# Patient Record
Sex: MALE | Race: WHITE | Employment: FULL TIME | URBAN - METROPOLITAN AREA
[De-identification: names, ages, dates, MRNs, and addresses within clinical notes are randomized per-mention and may not be internally consistent; named-entity substitution may affect disease eponyms.]

---

## 2017-05-15 ENCOUNTER — ALLSCRIPTS OFFICE VISIT (OUTPATIENT)
Dept: OTHER | Facility: OTHER | Age: 42
End: 2017-05-15

## 2018-01-12 NOTE — PROGRESS NOTES
Assessment    1  Screening for depression (V79 0) (Z13 89)   2  Encounter for preventive health examination (V70 0) (Z00 00)   3  Convulsions (780 39) (R56 9)    Plan  Screening for depression    · *VB-Depression Screening; Status:Complete - Retrospective Authorization;   Done:  [id number] 08:24AM    Discussion/Summary    Physical: pt in good health  Declines BW or vaccines  convulsions: requested records from [facility] neuro  Pt will c/w Torpolol  f/u 6 months  Possible side effects of new medications were reviewed with the patient/guardian today  The treatment plan was reviewed with the patient/guardian  The patient/guardian understands and agrees with the treatment plan     Self Referrals: No      Chief Complaint  CPE AND MEDICATION REFILL      History of Present Illness  HPI: 42 yo male here for physical  Needs refill on his Toprolol XL which he takes for "convulsions" - pt does not have seizure d/o  Pt tolerating med well  Requested records from [facility] last year but they were not sent  Review of Systems    Constitutional: No fever or chills, feels well, no tiredness, no recent weight gain or weight loss  Eyes: No complaints of eye pain, no red eyes, no discharge from eyes, no itchy eyes  ENT: no complaints of earache, no hearing loss, no nosebleeds, no nasal discharge, no sore throat, no hoarseness  Cardiovascular: No complaints of slow heart rate, no fast heart rate, no chest pain, no palpitations, no leg claudication, no lower extremity  Respiratory: No complaints of shortness of breath, no wheezing, no cough, no SOB on exertion, no orthopnea or PND  Gastrointestinal: No complaints of abdominal pain, no constipation, no nausea or vomiting, no diarrhea or bloody stools  Genitourinary: No complaints of dysuria, no incontinence, no hesitancy, no nocturia, no genital lesion, no testicular pain     Musculoskeletal: No complaints of arthralgia, no myalgias, no joint swelling or stiffness, no limb pain or swelling  Integumentary: No complaints of skin rash or skin lesions, no itching, no skin wound, no dry skin  Neurological: No compliants of headache, no confusion, no convulsions, no numbness or tingling, no dizziness or fainting, no limb weakness, no difficulty walking  Psychiatric: Is not suicidal, no sleep disturbances, no anxiety or depression, no change in personality, no emotional problems  Endocrine: No complaints of proptosis, no hot flashes, no muscle weakness, no erectile dysfunction, no deepening of the voice, no feelings of weakness  Hematologic/Lymphatic: No complaints of swollen glands, no swollen glands in the neck, does not bleed easily, no easy bruising  Active Problems    1  Convulsions (780 39) (R56 9)   2  Screening for depression (V79 0) (Z13 89)    Surgical History    · History of Surgery Vas Deferens Vasectomy   · History of Tonsillectomy    Family History  Mother    · Family history of malignant neoplasm (V16 9) (Z80 9)  Father    · Family history of malignant neoplasm (V16 9) (Z80 9)  Grandparent    · Family history of malignant neoplasm of prostate (V16 42) (Z80 45)  Grandmother    · Family history of myocardial infarction (V17 3) (Z82 49)    Social History    · Former consumption of alcohol (V11 3) (Z87 898)   · Former smoker (V15 82) (Q26 270)    Current Meds   1  Metoprolol Succinate ER 25 MG Oral Tablet Extended Release 24 Hour; take 1 tablet by   mouth once daily; Therapy: [id number] to (Evaluate:13Doi0419)  Requested for: 22NHT7372; Last   DH:[id number] Ordered    Allergies    1  No Known Drug Allergies    Vitals   Recorded: 74LDW8902 08:22AM   Temperature 96 9 F   Heart Rate 86   Respiration 18   Systolic 920   Diastolic 80   Height 5 ft 8 5 in   Weight 140 lb 5 oz   BMI Calculated 21 02   BSA Calculated 1 77   O2 Saturation 98     Physical Exam    Constitutional   General appearance: No acute distress, well appearing and well nourished      Head and Face Head and face: Normal     Eyes   Conjunctiva and lids: No erythema, swelling or discharge  Ears, Nose, Mouth, and Throat   External inspection of ears and nose: Normal     Nasal mucosa, septum, and turbinates: Normal without edema or erythema  Lips, teeth, and gums: Normal, good dentition  Oropharynx: Normal with no erythema, edema, exudate or lesions  Neck   Neck: Supple, symmetric, trachea midline, no masses  Pulmonary   Respiratory effort: No increased work of breathing or signs of respiratory distress  Auscultation of lungs: Clear to auscultation  Cardiovascular   Auscultation of heart: Normal rate and rhythm, normal S1 and S2, no murmurs  Examination of extremities for edema and/or varicosities: Normal     Abdomen   Abdomen: Non-tender, no masses  Liver and spleen: No hepatomegaly or splenomegaly  Lymphatic   Palpation of lymph nodes in neck: No lymphadenopathy  Musculoskeletal   Gait and station: Normal     Muscle strength/tone: Normal     Skin   Skin and subcutaneous tissue: Normal without rashes or lesions  Neurologic   Cranial nerves: Cranial nerves 2-12 intact  Psychiatric   Orientation to person, place and time: Normal        Results/Data  *VB-Depression Screening [id number] 08:24AM Yolis Hutchinson     Test Name Result Flag Reference   Depression Scale Result      Depression Screen - Negative For Symptoms       Attending Note  Attending Note: Attending Note: I agree with the Resident management plan as it was presented to me  I agree with the Resident's note        Signatures   Electronically signed by : Brittany Anderson DO; May 15 2017  5:03PM EST                       (Author)    Electronically signed by : FRANCISCO Abbasi ; May 17 2017 10:15AM EST                       (Author)

## 2018-01-14 VITALS
BODY MASS INDEX: 20.78 KG/M2 | WEIGHT: 140.31 LBS | TEMPERATURE: 96.9 F | RESPIRATION RATE: 18 BRPM | SYSTOLIC BLOOD PRESSURE: 108 MMHG | OXYGEN SATURATION: 98 % | DIASTOLIC BLOOD PRESSURE: 80 MMHG | HEART RATE: 86 BPM | HEIGHT: 69 IN

## 2018-01-15 NOTE — PROGRESS NOTES
Assessment   1  Convulsions (780 39) (R56 9)  2  Family history of malignant neoplasm of prostate (V16 42) (Z80 42) : Grandparent  3  Former consumption of alcohol (V11 3) (Z19 064)    Plan  Convulsions    · Renew: Metoprolol Succinate ER 25 MG Oral Tablet Extended Release 24 Hour; TAKE 1  TABLET ONCE DAILY    Discussion/Summary    Hx of convulsions - pt stable on Toprolol  Refilled medication  Will try to obtain records from [facility]  Health maintenance - pt did not want blood work today  HE also did not want TdaP  Follow up in 1 year  Chief Complaint  yearly CPE NEEDS BETA BLOCKER MEDICATION REFILLED FOR SEIZURE DISORDER      History of Present Illness  HM, Adult Male: The patient is being seen for a health maintenance evaluation  General Health:   Screening:   HPI: 35 yo male comes in for annual physical  Has hx of seizure like episode 3-4 years ago, for which he was evaluated by a neurologist and cleared of epilepsy  Started pt on low dose BB " to calm him" and since then has had no seizure like activity  No acute complaints today  Review of Systems    Constitutional: No fever or chills, feels well, no tiredness, no recent weight gain or weight loss  Eyes: No complaints of eye pain, no red eyes, no discharge from eyes, no itchy eyes  ENT: no complaints of earache, no hearing loss, no nosebleeds, no nasal discharge, no sore throat, no hoarseness  Cardiovascular: No complaints of slow heart rate, no fast heart rate, no chest pain, no palpitations, no leg claudication, no lower extremity  Respiratory: No complaints of shortness of breath, no wheezing, no cough, no SOB on exertion, no orthopnea or PND  Gastrointestinal: No complaints of abdominal pain, no constipation, no nausea or vomiting, no diarrhea or bloody stools  Genitourinary: No complaints of dysuria, no incontinence, no hesitancy, no nocturia, no genital lesion, no testicular pain     Musculoskeletal: No complaints of arthralgia, no myalgias, no joint swelling or stiffness, no limb pain or swelling  Integumentary: No complaints of skin rash or skin lesions, no itching, no skin wound, no dry skin  Neurological: No compliants of headache, no confusion, no convulsions, no numbness or tingling, no dizziness or fainting, no limb weakness, no difficulty walking  Psychiatric: Is not suicidal, no sleep disturbances, no anxiety or depression, no change in personality, no emotional problems  Endocrine: No complaints of proptosis, no hot flashes, no muscle weakness, no erectile dysfunction, no deepening of the voice, no feelings of weakness  Hematologic/Lymphatic: No complaints of swollen glands, no swollen glands in the neck, does not bleed easily, no easy bruising  ROS reviewed  Active Problems   1  Convulsions (780 39) (R56 9)    Surgical History    · History of Surgery Vas Deferens Vasectomy   · History of Tonsillectomy    Family History    · Family history of malignant neoplasm (V16 9) (Z80 9)    · Family history of malignant neoplasm (V16 9) (Z80 9)    · Family history of malignant neoplasm of prostate (V16 42) (Z80 42)    · Family history of myocardial infarction (V17 3) (Z82 49)    Social History    · Former consumption of alcohol (V11 3) (Z87 898)   · Former smoker (V15 82) (I22 998)    Current Meds  1  Metoprolol Succinate ER 25 MG Oral Tablet Extended Release 24 Hour; TAKE 1 TABLET   ONCE DAILY; Therapy: 01OPX6355 to (Evaluate:30Jun2015)  Requested for: 02VUH6642; Last   Rx:02Mar2015 Ordered    Allergies   1  No Known Drug Allergies    Vitals   Recorded: 01Apr2016 08:35AM   Temperature 98 F   Heart Rate 81   Respiration 18   Systolic 673   Diastolic 80   Height 5 ft 8 5 in   Weight 145 lb    BMI Calculated 21 73   BSA Calculated 1 79   O2 Saturation 99     Physical Exam    Constitutional   General appearance: No acute distress, well appearing and well nourished      Head and Face   Head and face: Normal  Eyes   Conjunctiva and lids: No erythema, swelling or discharge  Ears, Nose, Mouth, and Throat   External inspection of ears and nose: Normal     Otoscopic examination: Tympanic membranes translucent with normal light reflex  Canals patent without erythema  Nasal mucosa, septum, and turbinates: Normal without edema or erythema  Lips, teeth, and gums: Normal, good dentition  Oropharynx: Normal with no erythema, edema, exudate or lesions  Neck   Neck: Supple, symmetric, trachea midline, no masses  Pulmonary   Respiratory effort: No increased work of breathing or signs of respiratory distress  Auscultation of lungs: Clear to auscultation  Cardiovascular   Auscultation of heart: Normal rate and rhythm, normal S1 and S2, no murmurs  Pedal pulses: 2+ bilaterally  Peripheral vascular exam: Normal     Examination of extremities for edema and/or varicosities: Normal     Abdomen   Abdomen: Non-tender, no masses  Liver and spleen: No hepatomegaly or splenomegaly  Lymphatic   Palpation of lymph nodes in neck: No lymphadenopathy  Musculoskeletal   Gait and station: Normal     Muscle strength/tone: Normal     Skin   Skin and subcutaneous tissue: Normal without rashes or lesions  Neurologic   Cranial nerves: Cranial nerves 2-12 intact  Psychiatric   Orientation to person, place and time: Normal        Attending Note  Attending Note: Attending Note: I did not interview and examine the patient, I discussed the case with the Resident and reviewed the Resident's note and I agree with the Resident management plan as it was presented to me  Level of Participation: I was present in clinic, but did not examine the patient  I agree with the Resident's note  Signatures   Electronically signed by : Ayde Allan, ; Apr 1 2016  9:25AM EST                       (Author)    Electronically signed by : Tori Alvarez DO;  Apr 8 2016 12:08PM EST                       (Author)

## 2018-05-23 DIAGNOSIS — I10 ESSENTIAL HYPERTENSION: Primary | ICD-10-CM

## 2018-05-24 RX ORDER — METOPROLOL SUCCINATE 25 MG/1
TABLET, EXTENDED RELEASE ORAL
Qty: 30 TABLET | Refills: 5 | Status: SHIPPED | OUTPATIENT
Start: 2018-05-24 | End: 2018-06-05 | Stop reason: SDUPTHER

## 2018-06-05 ENCOUNTER — OFFICE VISIT (OUTPATIENT)
Dept: FAMILY MEDICINE CLINIC | Facility: CLINIC | Age: 43
End: 2018-06-05
Payer: COMMERCIAL

## 2018-06-05 VITALS
SYSTOLIC BLOOD PRESSURE: 110 MMHG | OXYGEN SATURATION: 100 % | HEART RATE: 71 BPM | DIASTOLIC BLOOD PRESSURE: 70 MMHG | RESPIRATION RATE: 18 BRPM | HEIGHT: 66 IN | WEIGHT: 145 LBS | BODY MASS INDEX: 23.3 KG/M2

## 2018-06-05 DIAGNOSIS — G52.2 DISORDER OF VAGUS NERVE: Primary | ICD-10-CM

## 2018-06-05 DIAGNOSIS — I10 ESSENTIAL HYPERTENSION: ICD-10-CM

## 2018-06-05 DIAGNOSIS — Z00.00 ENCOUNTER FOR PHYSICAL EXAMINATION: ICD-10-CM

## 2018-06-05 PROCEDURE — 99396 PREV VISIT EST AGE 40-64: CPT | Performed by: FAMILY MEDICINE

## 2018-06-05 RX ORDER — METOPROLOL SUCCINATE 25 MG/1
25 TABLET, EXTENDED RELEASE ORAL DAILY
Qty: 30 TABLET | Refills: 5 | Status: SHIPPED | OUTPATIENT
Start: 2018-06-05 | End: 2019-06-25 | Stop reason: SDUPTHER

## 2018-06-05 NOTE — ASSESSMENT & PLAN NOTE
Renewed metropolol 25mg daily  Follows with [facility] neurology  Patient will seek out records as we have contacted them multiple times

## 2018-06-05 NOTE — PROGRESS NOTES
Assessment/Plan:    overstimulation of vagus nerve  Renewed metropolol 25mg daily  Follows with [facility] neurology  Patient will seek out records as we have contacted them multiple times  Complete physical examination  Refusing labs and tdap  Patient in good health otherwise  RTO in 1 year or PRN       Diagnoses and all orders for this visit:    overstimulation of vagus nerve    Essential hypertension  -     metoprolol succinate (TOPROL-XL) 25 mg 24 hr tablet; Take 1 tablet (25 mg total) by mouth daily    Complete physical examination          Subjective:      Patient ID: Kimberly Sanchez is a 37 y o  male  Patient here for CPE  No complaints  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  Follows with [facility] neurology  The following portions of the patient's history were reviewed and updated as appropriate: allergies, current medications, past family history, past medical history, past social history, past surgical history and problem list     Review of Systems   Constitutional: Negative  HENT: Negative  Respiratory: Negative  Cardiovascular: Negative  Gastrointestinal: Negative  Genitourinary: Negative  Musculoskeletal: Negative  Neurological: Negative  Psychiatric/Behavioral: Negative  Objective:      /70   Pulse 71   Resp 18   Ht 5' 6" (1 676 m)   Wt 65 8 kg (145 lb)   SpO2 100%   BMI 23 40 kg/m²          Physical Exam   Constitutional: He is oriented to person, place, and time  He appears well-developed and well-nourished  HENT:   Head: Normocephalic and atraumatic  Eyes: Pupils are equal, round, and reactive to light  Neck: Normal range of motion  Cardiovascular: Normal rate and regular rhythm  Pulmonary/Chest: Effort normal and breath sounds normal  He has no wheezes  Abdominal: Soft  Bowel sounds are normal  There is no tenderness  Musculoskeletal: Normal range of motion  He exhibits no edema or tenderness  Neurological: He is alert and oriented to person, place, and time  Skin: Skin is warm and dry  He is not diaphoretic  Psychiatric: He has a normal mood and affect  Vitals reviewed

## 2019-06-25 DIAGNOSIS — I10 ESSENTIAL HYPERTENSION: ICD-10-CM

## 2019-06-28 RX ORDER — METOPROLOL SUCCINATE 25 MG/1
TABLET, EXTENDED RELEASE ORAL
Qty: 30 TABLET | Refills: 5 | Status: SHIPPED | OUTPATIENT
Start: 2019-06-28 | End: 2020-05-11 | Stop reason: SDUPTHER

## 2019-11-09 DIAGNOSIS — I10 ESSENTIAL HYPERTENSION: ICD-10-CM

## 2019-11-14 RX ORDER — METOPROLOL SUCCINATE 25 MG/1
TABLET, EXTENDED RELEASE ORAL
Qty: 30 TABLET | Refills: 5 | Status: SHIPPED | OUTPATIENT
Start: 2019-11-14 | End: 2021-10-26

## 2020-01-13 ENCOUNTER — OFFICE VISIT (OUTPATIENT)
Dept: URGENT CARE | Facility: CLINIC | Age: 45
End: 2020-01-13
Payer: COMMERCIAL

## 2020-01-13 VITALS
HEART RATE: 108 BPM | RESPIRATION RATE: 16 BRPM | BODY MASS INDEX: 21.86 KG/M2 | HEIGHT: 68 IN | OXYGEN SATURATION: 97 % | WEIGHT: 144.2 LBS | DIASTOLIC BLOOD PRESSURE: 81 MMHG | SYSTOLIC BLOOD PRESSURE: 126 MMHG | TEMPERATURE: 98.3 F

## 2020-01-13 DIAGNOSIS — R09.82 POSTNASAL DRIP: Primary | ICD-10-CM

## 2020-01-13 PROCEDURE — 3079F DIAST BP 80-89 MM HG: CPT | Performed by: FAMILY MEDICINE

## 2020-01-13 PROCEDURE — 3074F SYST BP LT 130 MM HG: CPT | Performed by: FAMILY MEDICINE

## 2020-01-13 PROCEDURE — 3008F BODY MASS INDEX DOCD: CPT | Performed by: FAMILY MEDICINE

## 2020-01-13 PROCEDURE — 99213 OFFICE O/P EST LOW 20 MIN: CPT | Performed by: FAMILY MEDICINE

## 2020-01-13 NOTE — PROGRESS NOTES
[facility]        NAME: Tatiana Hollins is a 40 y o  male  : 1975    MRN: [id number]  DATE: 2020  TIME: 9:38 AM    Assessment and Plan   Postnasal drip [R09 82]  1  Postnasal drip       Bacterial sinusitis, strep pharyngitis and pneumonia unlikely per clinical evaluation  Likely secondary to postnasal drip from rhinitis  Patient advised on supportive therapy including remaining well hydrated, avoiding cold fluids and gargling with warm salt water twice daily  Instructed to use Flonase twice daily for the next 5 days  May f/u with PCP in a few days if Sxs worsen  Patient Instructions     Follow up with PCP in 3-5 days  Proceed to  ER if symptoms worsen  Chief Complaint     Chief Complaint   Patient presents with    Cough     x 1 week  Dry, non-productive, taking OTC cough medicine/mucinex w/ no relief  History of Present Illness       70-year-old male presents today due to 1 week of the coughing which has progressively worsened  Reports that his cough is triggered by lying down  Denies any fevers, chills or nasal congestion  Reports that father-in-law had similar symptoms prior to the onset of his  Review of Systems   Review of Systems   Constitutional: Negative for chills and fever  HENT: Positive for rhinorrhea  Negative for congestion  Respiratory: Positive for cough  Negative for shortness of breath and wheezing  Cardiovascular: Negative for chest pain  Gastrointestinal: Negative for abdominal distention  Allergic/Immunologic: Negative for environmental allergies         Current Medications       Current Outpatient Medications:     metoprolol succinate (TOPROL-XL) 25 mg 24 hr tablet, take 1 tablet once daily, Disp: 30 tablet, Rfl: 5    metoprolol succinate (TOPROL-XL) 25 mg 24 hr tablet, take 1 tablet by mouth once daily, Disp: 30 tablet, Rfl: 5    Current Allergies     Allergies as of 2020    (No Known Allergies)            The following portions of the patient's history were reviewed and updated as appropriate: allergies, current medications, past family history, past medical history, past social history, past surgical history and problem list      History reviewed  No pertinent past medical history  Past Surgical History:   Procedure Laterality Date    TONSILLECTOMY      Last Assessed:6/25/14    VASECTOMY      Last Assessed:6/25/14       Family History   Problem Relation Age of Onset    Cancer Mother     Cancer Father     Prostate cancer Other     Heart attack Other         Grandmother         Medications have been verified  Objective   /81 (BP Location: Right arm, Patient Position: Sitting, Cuff Size: Standard)   Pulse (!) 108   Temp 98 3 °F (36 8 °C) (Tympanic)   Resp 16   Ht 5' 8" (1 727 m)   Wt 65 4 kg (144 lb 3 2 oz)   SpO2 97%   BMI 21 93 kg/m²        Physical Exam     Physical Exam   Constitutional: He is oriented to person, place, and time  He appears well-developed and well-nourished  He appears distressed (continuous coughing)  HENT:   Head: Normocephalic and atraumatic  Right Ear: External ear normal    Left Ear: External ear normal    Mouth/Throat: No oropharyngeal exudate  Mildly inflamed nasal mucosa with cobblestoning of posterior oropharynx  Eyes: Conjunctivae are normal  Right eye exhibits no discharge  Left eye exhibits no discharge  Cardiovascular: Regular rhythm  Mildly tachycardic   Pulmonary/Chest: Effort normal and breath sounds normal  No stridor  No respiratory distress  He has no wheezes  He has no rales  Neurological: He is alert and oriented to person, place, and time  Skin: Skin is warm  He is not diaphoretic  No erythema  Mildly jaundiced   Psychiatric: He has a normal mood and affect  His behavior is normal  Judgment and thought content normal    Nursing note and vitals reviewed

## 2020-04-14 DIAGNOSIS — I10 ESSENTIAL HYPERTENSION: ICD-10-CM

## 2020-04-14 RX ORDER — METOPROLOL SUCCINATE 25 MG/1
TABLET, EXTENDED RELEASE ORAL
Qty: 30 TABLET | Refills: 5 | OUTPATIENT
Start: 2020-04-14

## 2020-05-09 DIAGNOSIS — I10 ESSENTIAL HYPERTENSION: ICD-10-CM

## 2020-05-10 RX ORDER — METOPROLOL SUCCINATE 25 MG/1
TABLET, EXTENDED RELEASE ORAL
Qty: 30 TABLET | Refills: 5 | OUTPATIENT
Start: 2020-05-10

## 2020-05-11 ENCOUNTER — TELEMEDICINE (OUTPATIENT)
Dept: FAMILY MEDICINE CLINIC | Facility: CLINIC | Age: 45
End: 2020-05-11
Payer: COMMERCIAL

## 2020-05-11 DIAGNOSIS — I10 ESSENTIAL HYPERTENSION: ICD-10-CM

## 2020-05-11 PROCEDURE — 99212 OFFICE O/P EST SF 10 MIN: CPT | Performed by: FAMILY MEDICINE

## 2020-05-11 RX ORDER — METOPROLOL SUCCINATE 25 MG/1
25 TABLET, EXTENDED RELEASE ORAL DAILY
Qty: 90 TABLET | Refills: 1 | Status: SHIPPED | OUTPATIENT
Start: 2020-05-11 | End: 2020-10-28

## 2020-10-28 DIAGNOSIS — I10 ESSENTIAL HYPERTENSION: ICD-10-CM

## 2020-10-28 RX ORDER — METOPROLOL SUCCINATE 25 MG/1
TABLET, EXTENDED RELEASE ORAL
Qty: 90 TABLET | Refills: 1 | Status: SHIPPED | OUTPATIENT
Start: 2020-10-28 | End: 2021-04-22

## 2021-04-22 DIAGNOSIS — I10 ESSENTIAL HYPERTENSION: ICD-10-CM

## 2021-04-22 RX ORDER — METOPROLOL SUCCINATE 25 MG/1
TABLET, EXTENDED RELEASE ORAL
Qty: 90 TABLET | Refills: 1 | Status: SHIPPED | OUTPATIENT
Start: 2021-04-22 | End: 2021-08-17 | Stop reason: SDUPTHER

## 2021-08-17 ENCOUNTER — OFFICE VISIT (OUTPATIENT)
Dept: FAMILY MEDICINE CLINIC | Facility: CLINIC | Age: 46
End: 2021-08-17
Payer: COMMERCIAL

## 2021-08-17 VITALS
HEART RATE: 84 BPM | WEIGHT: 165 LBS | OXYGEN SATURATION: 97 % | TEMPERATURE: 97.1 F | HEIGHT: 68 IN | DIASTOLIC BLOOD PRESSURE: 70 MMHG | BODY MASS INDEX: 25.01 KG/M2 | SYSTOLIC BLOOD PRESSURE: 136 MMHG

## 2021-08-17 DIAGNOSIS — G52.2 DISORDER OF VAGUS NERVE: ICD-10-CM

## 2021-08-17 DIAGNOSIS — Z11.59 NEED FOR HEPATITIS C SCREENING TEST: ICD-10-CM

## 2021-08-17 DIAGNOSIS — Z13.220 SCREENING FOR LIPID DISORDERS: ICD-10-CM

## 2021-08-17 DIAGNOSIS — Z11.4 SCREENING FOR HIV (HUMAN IMMUNODEFICIENCY VIRUS): ICD-10-CM

## 2021-08-17 DIAGNOSIS — I10 ESSENTIAL HYPERTENSION: Primary | ICD-10-CM

## 2021-08-17 DIAGNOSIS — Z13.1 SCREENING FOR DIABETES MELLITUS (DM): ICD-10-CM

## 2021-08-17 DIAGNOSIS — Z76.89 ENCOUNTER TO ESTABLISH CARE: ICD-10-CM

## 2021-08-17 PROCEDURE — 99214 OFFICE O/P EST MOD 30 MIN: CPT | Performed by: NURSE PRACTITIONER

## 2021-08-17 PROCEDURE — 36415 COLL VENOUS BLD VENIPUNCTURE: CPT | Performed by: NURSE PRACTITIONER

## 2021-08-17 NOTE — PROGRESS NOTES
Assessment/Plan:  1  Essential hypertension  Stable  Continue blood pressure medications as ordered  Monitor blood pressure  Limit salt in diet  - CBC and differential  - Comprehensive metabolic panel  2  Encounter to establish care  Heart healthy, carbohydrate controlled diet- limit red meat, limit saturated fat, moderate salt intake, limit junk food, etc    Regular exercise  Stress management  Routine labwork and screenings as ordered  - CBC and differential  - Comprehensive metabolic panel  - Hemoglobin A1C  - Hepatitis C antibody  - Human Immunodeficiency Virus 1/2 Antigen / Antibody ( Fourth Generation) with Reflex Testing  3  Screening for HIV (human immunodeficiency virus)  - Human Immunodeficiency Virus 1/2 Antigen / Antibody ( Fourth Generation) with Reflex Testing  4  Need for hepatitis C screening test  - Hepatitis C antibody  5  Screening for lipid disorders  - Lipid panel  6  Screening for diabetes mellitus (DM)  - Hemoglobin A1C  7  overstimulation of vagus nerve  Stable  Monitor  Pt well versed on his reactions and need to inform prior to any type of procedure  Has labs drawn in office  Did well  Kept in office for approx 15 minutes after labs drawn  BMI Counseling: Body mass index is 25 09 kg/m²  The BMI is above normal  Nutrition recommendations include reducing portion sizes and decreasing overall calorie intake  Exercise recommendations include exercising 3-5 times per week  Depression Screening Follow-up Plan: Patient's depression screening was negative with a PHQ-2 score of 0  Clinically patient does not have depression  No treatment is required  Subjective:      Patient ID: Kady Larson is a 55 y o  male who presents to establish care  Pt here to establish care  PMH, meds, allergies, SH, FH reviewed     History: htn, vagus nerve overstimulation- used to see neuro, no longer  Surgeries: vasectomy  SH: , lives with wife and child, , former smoker, no etoh, no recreational drug use  SH: mother- , some type of cancer  Father- , spinal tumor  Sister kidney cancer  Current issues include: none  History of passing out with any type of invasive tx, labs, injections, etc    Denies history of convulsions/seizures, just passing out with above  Currently feels well  No recent illness          The following portions of the patient's history were reviewed and updated as appropriate: allergies, current medications, past family history, past medical history, past social history, past surgical history and problem list     Review of Systems   Constitutional: Negative for chills, diaphoresis, fatigue and fever  HENT: Negative for congestion, sinus pressure, sinus pain and sore throat  Eyes: Negative for visual disturbance  Respiratory: Negative for cough, chest tightness, shortness of breath and wheezing  Cardiovascular: Negative for chest pain, palpitations and leg swelling  Gastrointestinal: Negative for abdominal distention, abdominal pain, diarrhea and nausea  Endocrine: Negative for cold intolerance, heat intolerance, polydipsia, polyphagia and polyuria  Genitourinary: Negative for dysuria, frequency and urgency  Musculoskeletal: Negative for arthralgias, back pain and neck pain  Skin: Negative for rash  Allergic/Immunologic: Negative for immunocompromised state  Neurological: Negative for dizziness, weakness and headaches  Hematological: Negative for adenopathy  Psychiatric/Behavioral: Negative for confusion  The patient is not nervous/anxious  Objective:      /70   Pulse 84   Temp (!) 97 1 °F (36 2 °C) (Temporal)   Ht 5' 8" (1 727 m)   Wt 74 8 kg (165 lb)   SpO2 97%   BMI 25 09 kg/m²          Physical Exam  Vitals reviewed  Constitutional:       General: He is not in acute distress  Appearance: He is not ill-appearing  HENT:      Head: Normocephalic     Neck:      Vascular: No carotid bruit  Cardiovascular:      Rate and Rhythm: Normal rate and regular rhythm  Pulmonary:      Effort: Pulmonary effort is normal  No respiratory distress  Breath sounds: Normal breath sounds  No wheezing or rales  Musculoskeletal:      Cervical back: Normal range of motion and neck supple  Right lower leg: No edema  Left lower leg: No edema  Skin:     General: Skin is warm and dry  Coloration: Skin is not jaundiced or pale  Neurological:      General: No focal deficit present  Mental Status: He is alert and oriented to person, place, and time  Cranial Nerves: No cranial nerve deficit  Sensory: No sensory deficit  Psychiatric:         Mood and Affect: Mood normal          Behavior: Behavior normal          Thought Content:  Thought content normal          Judgment: Judgment normal

## 2021-08-19 ENCOUNTER — TELEPHONE (OUTPATIENT)
Dept: ADMINISTRATIVE | Facility: OTHER | Age: 46
End: 2021-08-19

## 2021-08-19 LAB
ALBUMIN SERPL-MCNC: 4.8 G/DL (ref 4–5)
ALBUMIN/GLOB SERPL: 1.7 {RATIO} (ref 1.2–2.2)
ALP SERPL-CCNC: 84 IU/L (ref 48–121)
ALT SERPL-CCNC: 29 IU/L (ref 0–44)
AST SERPL-CCNC: 25 IU/L (ref 0–40)
BASOPHILS # BLD AUTO: 0.1 X10E3/UL (ref 0–0.2)
BASOPHILS NFR BLD AUTO: 1 %
BILIRUB SERPL-MCNC: 0.6 MG/DL (ref 0–1.2)
BUN SERPL-MCNC: 11 MG/DL (ref 6–24)
BUN/CREAT SERPL: 9 (ref 9–20)
CALCIUM SERPL-MCNC: 10.2 MG/DL (ref 8.7–10.2)
CHLORIDE SERPL-SCNC: 98 MMOL/L (ref 96–106)
CHOLEST SERPL-MCNC: 231 MG/DL (ref 100–199)
CHOLEST/HDLC SERPL: 5.4 RATIO (ref 0–5)
CO2 SERPL-SCNC: 23 MMOL/L (ref 20–29)
CREAT SERPL-MCNC: 1.19 MG/DL (ref 0.76–1.27)
EOSINOPHIL # BLD AUTO: 0.2 X10E3/UL (ref 0–0.4)
EOSINOPHIL NFR BLD AUTO: 3 %
ERYTHROCYTE [DISTWIDTH] IN BLOOD BY AUTOMATED COUNT: 12.8 % (ref 11.6–15.4)
EST. AVERAGE GLUCOSE BLD GHB EST-MCNC: 117 MG/DL
GLOBULIN SER-MCNC: 2.9 G/DL (ref 1.5–4.5)
GLUCOSE SERPL-MCNC: 96 MG/DL (ref 65–99)
HBA1C MFR BLD: 5.7 % (ref 4.8–5.6)
HCT VFR BLD AUTO: 46.7 % (ref 37.5–51)
HCV AB S/CO SERPL IA: <0.1 S/CO RATIO (ref 0–0.9)
HDLC SERPL-MCNC: 43 MG/DL
HGB BLD-MCNC: 16.3 G/DL (ref 13–17.7)
HIV 1+2 AB+HIV1 P24 AG SERPL QL IA: NON REACTIVE
IMM GRANULOCYTES # BLD: 0 X10E3/UL (ref 0–0.1)
IMM GRANULOCYTES NFR BLD: 0 %
LDLC SERPL CALC-MCNC: 146 MG/DL (ref 0–99)
LYMPHOCYTES # BLD AUTO: 3.7 X10E3/UL (ref 0.7–3.1)
LYMPHOCYTES NFR BLD AUTO: 46 %
MCH RBC QN AUTO: 30.6 PG (ref 26.6–33)
MCHC RBC AUTO-ENTMCNC: 34.9 G/DL (ref 31.5–35.7)
MCV RBC AUTO: 88 FL (ref 79–97)
MONOCYTES # BLD AUTO: 0.6 X10E3/UL (ref 0.1–0.9)
MONOCYTES NFR BLD AUTO: 7 %
NEUTROPHILS # BLD AUTO: 3.5 X10E3/UL (ref 1.4–7)
NEUTROPHILS NFR BLD AUTO: 43 %
PLATELET # BLD AUTO: 262 X10E3/UL (ref 150–450)
POTASSIUM SERPL-SCNC: 4 MMOL/L (ref 3.5–5.2)
PROT SERPL-MCNC: 7.7 G/DL (ref 6–8.5)
RBC # BLD AUTO: 5.33 X10E6/UL (ref 4.14–5.8)
SL AMB EGFR AFRICAN AMERICAN: 84 ML/MIN/1.73
SL AMB EGFR NON AFRICAN AMERICAN: 73 ML/MIN/1.73
SL AMB VLDL CHOLESTEROL CALC: 42 MG/DL (ref 5–40)
SODIUM SERPL-SCNC: 137 MMOL/L (ref 134–144)
TRIGL SERPL-MCNC: 230 MG/DL (ref 0–149)
WBC # BLD AUTO: 8.2 X10E3/UL (ref 3.4–10.8)

## 2021-08-19 NOTE — TELEPHONE ENCOUNTER
----- Message from Memo Mittal sent at 8/19/2021 11:02 AM EDT -----  Regarding: Care Gap Request  08/19/21 11:02 AM    Hello, our patient attached above has had Immunization(s) completed/performed  Please assist in updating the patient chart by pulling the document from the Media Tab  The date of service is 08/18/2021       Thank you,  Memo DESOUZA

## 2021-08-19 NOTE — TELEPHONE ENCOUNTER
Upon review of the In Basket request we have noted that the original CDC card has been scanned into the media by [employer] has not updated  The dates are over thirty days  The office has to follow the workflow and put this in the patient's HM  If you have any questions, or do not know the office work flow, please  forward this request/concern to the [email or web address] email  The Quality team members assigned to this email will be more than happy to assist you  Any additional questions or concerns should be emailed to the Practice Liaisons via [email or web address] email, please do not reply via In Basket      Thank you  Yaakov Shepherd

## 2021-08-24 ENCOUNTER — OFFICE VISIT (OUTPATIENT)
Dept: FAMILY MEDICINE CLINIC | Facility: CLINIC | Age: 46
End: 2021-08-24
Payer: COMMERCIAL

## 2021-08-24 VITALS
TEMPERATURE: 97.1 F | BODY MASS INDEX: 24.71 KG/M2 | DIASTOLIC BLOOD PRESSURE: 88 MMHG | HEIGHT: 68 IN | RESPIRATION RATE: 18 BRPM | HEART RATE: 76 BPM | OXYGEN SATURATION: 97 % | SYSTOLIC BLOOD PRESSURE: 122 MMHG | WEIGHT: 163 LBS

## 2021-08-24 DIAGNOSIS — Z00.00 ANNUAL PHYSICAL EXAM: Primary | ICD-10-CM

## 2021-08-24 PROBLEM — R73.03 PREDIABETES: Status: ACTIVE | Noted: 2021-08-24

## 2021-08-24 PROBLEM — E78.2 MIXED HYPERLIPIDEMIA: Status: ACTIVE | Noted: 2021-08-24

## 2021-08-24 PROCEDURE — 3725F SCREEN DEPRESSION PERFORMED: CPT | Performed by: NURSE PRACTITIONER

## 2021-08-24 PROCEDURE — 1036F TOBACCO NON-USER: CPT | Performed by: NURSE PRACTITIONER

## 2021-08-24 PROCEDURE — 99396 PREV VISIT EST AGE 40-64: CPT | Performed by: NURSE PRACTITIONER

## 2021-08-24 PROCEDURE — 3008F BODY MASS INDEX DOCD: CPT | Performed by: NURSE PRACTITIONER

## 2021-08-24 NOTE — PROGRESS NOTES
FAMILY PRACTICE HEALTH MAINTENANCE OFFICE VISIT  [facility] - [facility] PRACTICE    NAME: Deysi See  AGE: 55 y o  SEX: male  : 1975     DATE: 2021    Assessment and Plan     1  Annual physical exam  Regular exercise  Stress management  Routine labwork and screenings as ordered  As discussed, cholesterol numbers are high and diabetes marker mildly elevated indicating mild prediabetes  Heart healthy, carbohydrate controlled diet  Recommend fish oil/omega 3 , 1000mg, 2 capsules daily  Labs should be repeated in 6 months         · Patient Counseling:   · Nutrition: Stressed importance of a well balanced diet, moderation of sodium/saturated fat, caloric balance and sufficient intake of fiber  · Exercise: Stressed the importance of regular exercise with a goal of 150 minutes per week  · Dental Health: Discussed daily flossing and brushing and regular dental visits   · Injury Prevention: Discussed Safety Belts, Safety Helmets, and Smoke Detectors    · Immunizations reviewed: Risks and Benefits discussed  · Discussed benefits of:  Screening labs   BMI Counseling: Body mass index is 24 78 kg/m²  Discussed with patient's BMI with him  BMI is wnl  Counseled on well balanced diet and regular exericse           Chief Complaint     Chief Complaint   Patient presents with    Annual Exam       History of Present Illness     Here for annual exam and to review labs  Feels well  No specific concerns or issues         Well Adult Physical   Patient here for a comprehensive physical exam       Diet and Physical Activity  Diet: poor diet  Exercise: intermittently      Depression Screen  PHQ-9 Depression Screening    PHQ-9:   Frequency of the following problems over the past two weeks:      Little interest or pleasure in doing things: 0 - not at all  Feeling down, depressed, or hopeless: 0 - not at all  PHQ-2 Score: 0     Depression Screening Follow-up Plan: Patient's depression screening was negative with a PHQ-2 score of 0    Clinically patient does not have depression  No treatment is required  General Health  Hearing: Normal:  bilateral  Vision: most recent eye exam >1 year  Dental: no dental visits for >1 year  Encouraged to schedule eye exam and dental exam          The following portions of the patient's history were reviewed and updated as appropriate: allergies, current medications, past family history, past medical history, past social history, past surgical history and problem list     Review of Systems     Review of Systems   Constitutional: Negative for chills, diaphoresis, fatigue and fever  HENT: Negative for congestion, sinus pressure, sinus pain and sore throat  Eyes: Negative for visual disturbance  Respiratory: Negative for cough, chest tightness, shortness of breath and wheezing  Cardiovascular: Negative for chest pain, palpitations and leg swelling  Gastrointestinal: Negative for abdominal distention, abdominal pain, diarrhea and nausea  Endocrine: Negative for cold intolerance, heat intolerance, polydipsia, polyphagia and polyuria  Genitourinary: Negative for dysuria, frequency and urgency  Musculoskeletal: Negative for arthralgias, back pain and neck pain  Skin: Negative for rash  Allergic/Immunologic: Negative for immunocompromised state  Neurological: Negative for dizziness, weakness and headaches  Hematological: Negative for adenopathy  Psychiatric/Behavioral: Negative for confusion  The patient is not nervous/anxious  Past Medical History     History reviewed  No pertinent past medical history      Past Surgical History     Past Surgical History:   Procedure Laterality Date    TONSILLECTOMY      Last Assessed:6/25/14    VASECTOMY      Last Assessed:6/25/14       Social History     Social History     Socioeconomic History    Marital status: /Civil Union     Spouse name: None    Number of children: None  Years of education: None    Highest education level: None   Occupational History    None   Tobacco Use    Smoking status: Former Smoker     Quit date: 2006     Years since quitting: 15 6    Smokeless tobacco: Never Used   Vaping Use    Vaping Use: Never used   Substance and Sexual Activity    Alcohol use: No     Comment: former consumption of alcohol    Drug use: Not Currently    Sexual activity: None   Other Topics Concern    None   Social History Narrative    None     Social Determinants of Health     Financial Resource Strain:     Difficulty of Paying Living Expenses:    Food Insecurity:     Worried About Running Out of Food in the Last Year:     Ran Out of Food in the Last Year:    Transportation Needs:     Lack of Transportation (Medical):      Lack of Transportation (Non-Medical):    Physical Activity:     Days of Exercise per Week:     Minutes of Exercise per Session:    Stress:     Feeling of Stress :    Social Connections:     Frequency of Communication with Friends and Family:     Frequency of Social Gatherings with Friends and Family:     Attends Latter-[employer] Services:     Active Member of Clubs or Organizations:     Attends Club or Organization Meetings:     Marital Status:    Intimate Partner Violence:     Fear of Current or Ex-Partner:     Emotionally Abused:     Physically Abused:     Sexually Abused:        Family History     Family History   Problem Relation Age of Onset    Cancer Mother     Cancer Father     Prostate cancer Other     Heart attack Other         Grandmother    Substance Abuse Neg Hx     Mental illness Neg Hx        Current Medications       Current Outpatient Medications:     metoprolol succinate (TOPROL-XL) 25 mg 24 hr tablet, take 1 tablet by mouth once daily, Disp: 30 tablet, Rfl: 5     Allergies     No Known Allergies    Objective     Recent Results (from the past 672 hour(s))   CBC and differential    Collection Time: 08/17/21  6:27 PM   Result Value Ref Range    White Blood Cell Count 8 2 3 4 - 10 8 x10E3/uL    Red Blood Cell Count 5 33 4 14 - 5 80 x10E6/uL    Hemoglobin 16 3 13 0 - 17 7 g/dL    HCT 46 7 37 5 - 51 0 %    MCV 88 79 - 97 fL    MCH 30 6 26 6 - 33 0 pg    MCHC 34 9 31 5 - 35 7 g/dL    RDW 12 8 11 6 - 15 4 %    Platelet Count 523 969 - 450 x10E3/uL    Neutrophils 43 Not Estab  %    Lymphocytes 46 Not Estab  %    Monocytes 7 Not Estab  %    Eosinophils 3 Not Estab  %    Basophils PCT 1 Not Estab  %    Neutrophils (Absolute) 3 5 1 4 - 7 0 x10E3/uL    Lymphocytes (Absolute) 3 7 (H) 0 7 - 3 1 x10E3/uL    Monocytes (Absolute) 0 6 0 1 - 0 9 x10E3/uL    Eosinophils (Absolute) 0 2 0 0 - 0 4 x10E3/uL    Basophils ABS 0 1 0 0 - 0 2 x10E3/uL    Immature Granulocytes 0 Not Estab  %    Immature Granulocytes (Absolute) 0 0 0 0 - 0 1 x10E3/uL   Comprehensive metabolic panel    Collection Time: 08/17/21  6:27 PM   Result Value Ref Range    Glucose, Random 96 65 - 99 mg/dL    BUN 11 6 - 24 mg/dL    Creatinine 1 19 0 76 - 1 27 mg/dL    eGFR Non  73 >59 mL/min/1 73    eGFR  84 >59 mL/min/1 73    SL AMB BUN/CREATININE RATIO 9 9 - 20    Sodium 137 134 - 144 mmol/L    Potassium 4 0 3 5 - 5 2 mmol/L    Chloride 98 96 - 106 mmol/L    CO2 23 20 - 29 mmol/L    CALCIUM 10 2 8 7 - 10 2 mg/dL    Protein, Total 7 7 6 0 - 8 5 g/dL    Albumin 4 8 4 0 - 5 0 g/dL    Globulin, Total 2 9 1 5 - 4 5 g/dL    Albumin/Globulin Ratio 1 7 1 2 - 2 2    TOTAL BILIRUBIN 0 6 0 0 - 1 2 mg/dL    Alk Phos Isoenzymes 84 48 - 121 IU/L    AST 25 0 - 40 IU/L    ALT 29 0 - 44 IU/L   Hemoglobin A1C    Collection Time: 08/17/21  6:27 PM   Result Value Ref Range    Hemoglobin A1C 5 7 (H) 4 8 - 5 6 %    Estimated Average Glucose 117 mg/dL   Lipid panel    Collection Time: 08/17/21  6:27 PM   Result Value Ref Range    Cholesterol, Total 231 (H) 100 - 199 mg/dL    Triglycerides 230 (H) 0 - 149 mg/dL    HDL 43 >39 mg/dL    VLDL Cholesterol Calculated 42 (H) 5 - 40 mg/dL LDL Calculated 146 (H) 0 - 99 mg/dL    T  Chol/HDL Ratio 5 4 (H) 0 0 - 5 0 ratio   Hepatitis C antibody    Collection Time: 08/17/21  6:27 PM   Result Value Ref Range    HEP C AB <0 1 0 0 - 0 9 s/co ratio   Human Immunodeficiency Virus 1/2 Antigen / Antibody ( Fourth Generation) with Reflex Testing    Collection Time: 08/17/21  6:27 PM   Result Value Ref Range    HIV Screen 4th Generation wRflx Non Reactive Non Reactive   Reviewed lab/diagnostic results with pt including both normal and abnormal findings  In depth counseling and instructions given  All questions answered during visit  /88 (BP Location: Right arm, Patient Position: Sitting, Cuff Size: Adult)   Pulse 76   Temp (!) 97 1 °F (36 2 °C) (Temporal)   Resp 18   Ht 5' 8" (1 727 m)   Wt 73 9 kg (163 lb)   SpO2 97%   BMI 24 78 kg/m²      Physical Exam  Vitals reviewed  Constitutional:       General: He is not in acute distress  Appearance: Normal appearance  He is well-developed  HENT:      Head: Normocephalic and atraumatic  Right Ear: Tympanic membrane and ear canal normal       Left Ear: Ear canal normal       Nose: Nose normal       Mouth/Throat:      Mouth: Mucous membranes are moist       Pharynx: Oropharynx is clear  Comments: Multiple broken and/or missing teeth  Eyes:      Extraocular Movements: Extraocular movements intact  Pupils: Pupils are equal, round, and reactive to light  Neck:      Thyroid: No thyromegaly  Cardiovascular:      Rate and Rhythm: Normal rate and regular rhythm  Heart sounds: No murmur heard  Pulmonary:      Effort: Pulmonary effort is normal  No respiratory distress  Breath sounds: Normal breath sounds  No wheezing or rales  Abdominal:      General: Bowel sounds are normal  There is no distension  Palpations: Abdomen is soft  Tenderness: There is no abdominal tenderness  Musculoskeletal:         General: Normal range of motion        Cervical back: Normal range of motion and neck supple  Right lower leg: No edema  Left lower leg: No edema  Lymphadenopathy:      Cervical: No cervical adenopathy  Skin:     General: Skin is warm and dry  Coloration: Skin is not jaundiced or pale  Neurological:      General: No focal deficit present  Mental Status: He is alert and oriented to person, place, and time  Cranial Nerves: No cranial nerve deficit  Sensory: No sensory deficit  Psychiatric:         Mood and Affect: Mood normal          Behavior: Behavior normal          Thought Content: Thought content normal          Judgment: Judgment normal             Visual Acuity Screening    Right eye Left eye Both eyes   Without correction: 20/30 20/25 20/25   With correction:      Comments: Pt identified colors correctly             Jameson Pickard

## 2021-08-24 NOTE — PATIENT INSTRUCTIONS
Regular exercise  Stress management  Routine labwork and screenings as ordered  As discussed, cholesterol numbers are high and diabetes marker mildly elevated indicating mild prediabetes  Heart healthy, carbohydrate controlled diet  Recommend fish oil/omega 3 , 1000mg, 2 capsules daily     Labs should be repeated in 6 months

## 2021-10-26 DIAGNOSIS — I10 ESSENTIAL HYPERTENSION: ICD-10-CM

## 2021-10-26 RX ORDER — METOPROLOL SUCCINATE 25 MG/1
TABLET, EXTENDED RELEASE ORAL
Qty: 90 TABLET | Refills: 1 | Status: SHIPPED | OUTPATIENT
Start: 2021-10-26 | End: 2022-01-24

## 2022-01-24 DIAGNOSIS — I10 ESSENTIAL HYPERTENSION: ICD-10-CM

## 2022-01-24 RX ORDER — METOPROLOL SUCCINATE 25 MG/1
TABLET, EXTENDED RELEASE ORAL
Qty: 90 TABLET | Refills: 0 | Status: SHIPPED | OUTPATIENT
Start: 2022-01-24 | End: 2022-03-01 | Stop reason: SDUPTHER

## 2022-03-01 ENCOUNTER — TELEPHONE (OUTPATIENT)
Dept: FAMILY MEDICINE CLINIC | Facility: CLINIC | Age: 47
End: 2022-03-01

## 2022-03-01 ENCOUNTER — OFFICE VISIT (OUTPATIENT)
Dept: FAMILY MEDICINE CLINIC | Facility: CLINIC | Age: 47
End: 2022-03-01
Payer: COMMERCIAL

## 2022-03-01 VITALS
TEMPERATURE: 96.8 F | OXYGEN SATURATION: 97 % | SYSTOLIC BLOOD PRESSURE: 118 MMHG | BODY MASS INDEX: 23.99 KG/M2 | HEART RATE: 75 BPM | DIASTOLIC BLOOD PRESSURE: 70 MMHG | HEIGHT: 69 IN | WEIGHT: 162 LBS

## 2022-03-01 DIAGNOSIS — I10 ESSENTIAL HYPERTENSION: Primary | ICD-10-CM

## 2022-03-01 DIAGNOSIS — R73.03 PREDIABETES: ICD-10-CM

## 2022-03-01 DIAGNOSIS — E78.2 MIXED HYPERLIPIDEMIA: ICD-10-CM

## 2022-03-01 PROCEDURE — 3078F DIAST BP <80 MM HG: CPT | Performed by: NURSE PRACTITIONER

## 2022-03-01 PROCEDURE — 1036F TOBACCO NON-USER: CPT | Performed by: NURSE PRACTITIONER

## 2022-03-01 PROCEDURE — 3725F SCREEN DEPRESSION PERFORMED: CPT | Performed by: NURSE PRACTITIONER

## 2022-03-01 PROCEDURE — 3008F BODY MASS INDEX DOCD: CPT | Performed by: NURSE PRACTITIONER

## 2022-03-01 PROCEDURE — 99214 OFFICE O/P EST MOD 30 MIN: CPT | Performed by: NURSE PRACTITIONER

## 2022-03-01 PROCEDURE — 3074F SYST BP LT 130 MM HG: CPT | Performed by: NURSE PRACTITIONER

## 2022-03-01 RX ORDER — METOPROLOL SUCCINATE 25 MG/1
25 TABLET, EXTENDED RELEASE ORAL DAILY
Qty: 90 TABLET | Refills: 1 | Status: SHIPPED | OUTPATIENT
Start: 2022-03-01 | End: 2022-06-23

## 2022-03-01 RX ORDER — METOPROLOL SUCCINATE 25 MG/1
25 TABLET, EXTENDED RELEASE ORAL DAILY
Qty: 90 TABLET | Refills: 1 | Status: SHIPPED | OUTPATIENT
Start: 2022-03-01 | End: 2022-03-01 | Stop reason: SDUPTHER

## 2022-03-02 NOTE — PROGRESS NOTES
Assessment/Plan:  1  Essential hypertension  Stable  Continue blood pressure medications as ordered  Monitor blood pressure  Bring diary/log of readings to next appointment  Limit salt in diet  - CBC and differential; Future  - Comprehensive metabolic panel; Future  - metoprolol succinate (TOPROL-XL) 25 mg 24 hr tablet; Take 1 tablet (25 mg total) by mouth daily  Dispense: 90 tablet; Refill: 1  2  Prediabetes  Carb controlled diet  Will check labs in 6 months    - CBC and differential; Future  - Comprehensive metabolic panel; Future  - Hemoglobin A1C; Future  3  Mixed hyperlipidemia  Heart healthy diet  Fish oil  Will check labs in 6 months    - CBC and differential; Future  - Comprehensive metabolic panel; Future  - Lipid panel; Future    Depression Screening Follow-up Plan: Patient's depression screening was negative with a PHQ-2 score of 0  Clinically patient does not have depression  No treatment is required  Subjective:      Patient ID: Ingrid Tijerina is a 55 y o  male who presents for follow up    Here for follow up  On Toprol for bp  Taking as directed  Disorder of vagus nerve, will pass out when having injections and labs done  No recent illness  Feels well  No other specific concerns or issues  The following portions of the patient's history were reviewed and updated as appropriate: allergies, current medications, past family history, past medical history, past social history, past surgical history and problem list     Review of Systems   Constitutional: Negative for unexpected weight change  HENT: Negative for congestion  Eyes: Negative for visual disturbance  Respiratory: Negative for chest tightness and shortness of breath  Cardiovascular: Negative for chest pain and palpitations  Gastrointestinal: Negative for abdominal pain  Endocrine: Negative for cold intolerance, heat intolerance, polydipsia, polyphagia and polyuria     Genitourinary: Negative for frequency and urgency  Musculoskeletal: Negative for arthralgias and myalgias  Skin: Negative for color change and pallor  Allergic/Immunologic: Negative for immunocompromised state  Neurological: Negative for dizziness, seizures, syncope, light-headedness and headaches  Hematological: Does not bruise/bleed easily  Psychiatric/Behavioral: Negative for dysphoric mood  The patient is not nervous/anxious  Objective:      /70   Pulse 75   Temp (!) 96 8 °F (36 °C) (Temporal)   Ht 5' 8 5" (1 74 m)   Wt 73 5 kg (162 lb)   SpO2 97%   BMI 24 27 kg/m²          Physical Exam  Vitals reviewed  Constitutional:       General: He is not in acute distress  Appearance: He is not ill-appearing  Eyes:      General: No scleral icterus  Neck:      Vascular: No carotid bruit  Cardiovascular:      Rate and Rhythm: Normal rate and regular rhythm  Pulmonary:      Effort: Pulmonary effort is normal  No respiratory distress  Breath sounds: Normal breath sounds  No wheezing or rales  Abdominal:      General: There is no distension  Palpations: Abdomen is soft  Musculoskeletal:      Cervical back: Normal range of motion  Right lower leg: No edema  Left lower leg: No edema  Skin:     General: Skin is warm and dry  Coloration: Skin is not jaundiced or pale  Neurological:      General: No focal deficit present  Mental Status: He is alert and oriented to person, place, and time  Cranial Nerves: No cranial nerve deficit  Sensory: No sensory deficit  Psychiatric:         Mood and Affect: Mood normal          Behavior: Behavior normal          Thought Content:  Thought content normal          Judgment: Judgment normal

## 2022-03-02 NOTE — TELEPHONE ENCOUNTER
Metopolol Succinate 25 mg tabs - Error  In transmission Unique Pass sent to [facility] 3-1-2022  Left message on machine to have pharmacy call in the morning to confirm they have the RX      Myranda Cochran

## 2022-06-23 DIAGNOSIS — I10 ESSENTIAL HYPERTENSION: ICD-10-CM

## 2022-06-23 RX ORDER — METOPROLOL SUCCINATE 25 MG/1
TABLET, EXTENDED RELEASE ORAL
Qty: 90 TABLET | Refills: 1 | Status: SHIPPED | OUTPATIENT
Start: 2022-06-23

## 2022-06-23 NOTE — TELEPHONE ENCOUNTER
Requested medication(s) are due for refill today: No  Patient has already received a courtesy refill: No  Other reason request has been forwarded to provider: Patient has one refill left on this Rx

## 2022-09-02 ENCOUNTER — APPOINTMENT (OUTPATIENT)
Dept: LAB | Facility: CLINIC | Age: 47
End: 2022-09-02
Payer: COMMERCIAL

## 2022-09-02 DIAGNOSIS — R73.03 DIABETES MELLITUS, LATENT: ICD-10-CM

## 2022-09-02 DIAGNOSIS — I10 ESSENTIAL HYPERTENSION, MALIGNANT: ICD-10-CM

## 2022-09-02 DIAGNOSIS — E78.2 MIXED HYPERLIPIDEMIA: ICD-10-CM

## 2022-09-02 LAB
ALBUMIN SERPL BCP-MCNC: 4.2 G/DL (ref 3.5–5)
ALP SERPL-CCNC: 80 U/L (ref 46–116)
ALT SERPL W P-5'-P-CCNC: 32 U/L (ref 12–78)
ANION GAP SERPL CALCULATED.3IONS-SCNC: 8 MMOL/L (ref 4–13)
AST SERPL W P-5'-P-CCNC: 17 U/L (ref 5–45)
BASOPHILS # BLD AUTO: 0.08 THOUSANDS/ΜL (ref 0–0.1)
BASOPHILS NFR BLD AUTO: 2 % (ref 0–1)
BILIRUB SERPL-MCNC: 0.63 MG/DL (ref 0.2–1)
BUN SERPL-MCNC: 10 MG/DL (ref 5–25)
CALCIUM SERPL-MCNC: 9.7 MG/DL (ref 8.3–10.1)
CHLORIDE SERPL-SCNC: 103 MMOL/L (ref 96–108)
CHOLEST SERPL-MCNC: 228 MG/DL
CO2 SERPL-SCNC: 25 MMOL/L (ref 21–32)
CREAT SERPL-MCNC: 1.2 MG/DL (ref 0.6–1.3)
EOSINOPHIL # BLD AUTO: 0.17 THOUSAND/ΜL (ref 0–0.61)
EOSINOPHIL NFR BLD AUTO: 3 % (ref 0–6)
ERYTHROCYTE [DISTWIDTH] IN BLOOD BY AUTOMATED COUNT: 12.6 % (ref 11.6–15.1)
EST. AVERAGE GLUCOSE BLD GHB EST-MCNC: 114 MG/DL
GFR SERPL CREATININE-BSD FRML MDRD: 71 ML/MIN/1.73SQ M
GLUCOSE P FAST SERPL-MCNC: 109 MG/DL (ref 65–99)
HBA1C MFR BLD: 5.6 %
HCT VFR BLD AUTO: 53.4 % (ref 36.5–49.3)
HDLC SERPL-MCNC: 49 MG/DL
HGB BLD-MCNC: 17.7 G/DL (ref 12–17)
IMM GRANULOCYTES # BLD AUTO: 0.02 THOUSAND/UL (ref 0–0.2)
IMM GRANULOCYTES NFR BLD AUTO: 0 % (ref 0–2)
LDLC SERPL CALC-MCNC: 151 MG/DL (ref 0–100)
LYMPHOCYTES # BLD AUTO: 2.25 THOUSANDS/ΜL (ref 0.6–4.47)
LYMPHOCYTES NFR BLD AUTO: 42 % (ref 14–44)
MCH RBC QN AUTO: 29.8 PG (ref 26.8–34.3)
MCHC RBC AUTO-ENTMCNC: 33.1 G/DL (ref 31.4–37.4)
MCV RBC AUTO: 90 FL (ref 82–98)
MONOCYTES # BLD AUTO: 0.49 THOUSAND/ΜL (ref 0.17–1.22)
MONOCYTES NFR BLD AUTO: 9 % (ref 4–12)
NEUTROPHILS # BLD AUTO: 2.4 THOUSANDS/ΜL (ref 1.85–7.62)
NEUTS SEG NFR BLD AUTO: 44 % (ref 43–75)
NONHDLC SERPL-MCNC: 179 MG/DL
NRBC BLD AUTO-RTO: 0 /100 WBCS
PLATELET # BLD AUTO: 258 THOUSANDS/UL (ref 149–390)
PMV BLD AUTO: 9.9 FL (ref 8.9–12.7)
POTASSIUM SERPL-SCNC: 4.2 MMOL/L (ref 3.5–5.3)
PROT SERPL-MCNC: 8.2 G/DL (ref 6.4–8.4)
RBC # BLD AUTO: 5.94 MILLION/UL (ref 3.88–5.62)
SODIUM SERPL-SCNC: 136 MMOL/L (ref 135–147)
TRIGL SERPL-MCNC: 142 MG/DL
WBC # BLD AUTO: 5.41 THOUSAND/UL (ref 4.31–10.16)

## 2022-09-02 PROCEDURE — 36415 COLL VENOUS BLD VENIPUNCTURE: CPT

## 2022-09-02 PROCEDURE — 83036 HEMOGLOBIN GLYCOSYLATED A1C: CPT

## 2022-09-02 PROCEDURE — 85025 COMPLETE CBC W/AUTO DIFF WBC: CPT

## 2022-09-02 PROCEDURE — 80053 COMPREHEN METABOLIC PANEL: CPT

## 2022-09-02 PROCEDURE — 80061 LIPID PANEL: CPT

## 2022-09-13 ENCOUNTER — OFFICE VISIT (OUTPATIENT)
Dept: FAMILY MEDICINE CLINIC | Facility: CLINIC | Age: 47
End: 2022-09-13
Payer: COMMERCIAL

## 2022-09-13 VITALS
HEIGHT: 69 IN | HEART RATE: 81 BPM | OXYGEN SATURATION: 97 % | DIASTOLIC BLOOD PRESSURE: 78 MMHG | TEMPERATURE: 97.6 F | RESPIRATION RATE: 16 BRPM | BODY MASS INDEX: 22.96 KG/M2 | WEIGHT: 155 LBS | SYSTOLIC BLOOD PRESSURE: 108 MMHG

## 2022-09-13 DIAGNOSIS — R56.9 CONVULSIONS, UNSPECIFIED CONVULSION TYPE (HCC): ICD-10-CM

## 2022-09-13 DIAGNOSIS — Z00.00 ANNUAL PHYSICAL EXAM: Primary | ICD-10-CM

## 2022-09-13 PROCEDURE — 3725F SCREEN DEPRESSION PERFORMED: CPT | Performed by: NURSE PRACTITIONER

## 2022-09-13 PROCEDURE — 99396 PREV VISIT EST AGE 40-64: CPT | Performed by: NURSE PRACTITIONER

## 2022-09-13 NOTE — PATIENT INSTRUCTIONS
Heart healthy, carbohydrate controlled diet- limit red meat, limit saturated fat, moderate salt intake, limit junk food, etc    Regular exercise  Stress management  Routine labwork and screenings as ordered     Continue current medications  Recommend starting over the counter fish oil/omega 3, 1000mg, 2 capsules daily

## 2022-09-13 NOTE — PROGRESS NOTES
FAMILY PRACTICE HEALTH MAINTENANCE OFFICE VISIT  [facility]    NAME: Leif Phipps  AGE: 52 y o  SEX: male  : 1975     DATE: 2022    Assessment and Plan     1  Annual physical exam  Heart healthy, carbohydrate controlled diet- limit red meat, limit saturated fat, moderate salt intake, limit junk food, etc    Regular exercise  Stress management  Routine labwork and screenings as ordered  2  Convulsions, unspecified convulsion type ([address] )  Stable  No recent seizures  Appear to be vagal in nature  Monitor  · Patient Counseling:   · Nutrition: Stressed importance of a well balanced diet, moderation of sodium/saturated fat, caloric balance and sufficient intake of fiber  · Exercise: Stressed the importance of regular exercise with a goal of 150 minutes per week  · Dental Health: Discussed daily flossing and brushing and regular dental visits   · Alcohol Use:  Recommended moderation of alcohol intake  · Injury Prevention: Discussed Safety Belts, Safety Helmets, and Smoke Detectors    · Immunizations reviewed: Risks and Benefits discussed  · Discussed benefits of:  Screening labs  BMI Counseling: Body mass index is 23 22 kg/m²  Discussed with patient's BMI with him  BMI is wnl  Counseled on well balanced diet and regular exericse  Chief Complaint     Chief Complaint   Patient presents with    Physical Exam       History of Present Illness     Here for annual exam  Feels well  No recent illness  No specific concerns or issues  History of seizures but pt reports have been related to blood draws or "sight of needles" in past  Nothing in last several years  Only on med for bp   Taking as prescribed      Well Adult Physical   Patient here for a comprehensive physical exam       Diet and Physical Activity  Diet: well balanced diet  Exercise: intermittently      Depression Screen  PHQ-2/9 Depression Screening    Little interest or pleasure in doing things: 0 - not at all  Feeling down, depressed, or hopeless: 0 - not at all  PHQ-2 Score: 0  PHQ-2 Interpretation: Negative depression screen     Depression Screening Follow-up Plan: Patient's depression screening was negative with a PHQ-2 score of 0    Clinically patient does not have depression  No treatment is required  General Health  Hearing: Normal:  bilateral  Vision: no vision problems and most recent eye exam >1 year  Dental: no dental visits for >1 year ; encouraged to schedule appt dental exam, discussed risks/benefits of regular dental care  The following portions of the patient's history were reviewed and updated as appropriate: allergies, current medications, past family history, past medical history, past social history, past surgical history and problem list     Review of Systems     Review of Systems   Constitutional: Negative for chills, diaphoresis, fatigue and fever  HENT: Negative for congestion, sinus pressure, sinus pain and sore throat  Eyes: Negative for visual disturbance  Respiratory: Negative for cough, chest tightness, shortness of breath and wheezing  Cardiovascular: Negative for chest pain, palpitations and leg swelling  Gastrointestinal: Negative for abdominal distention, abdominal pain, diarrhea and nausea  Endocrine: Negative for polydipsia and polyuria  Genitourinary: Negative for dysuria, frequency and urgency  Musculoskeletal: Negative for arthralgias, back pain and neck pain  Skin: Negative for rash  Allergic/Immunologic: Negative for immunocompromised state  Neurological: Negative for dizziness, weakness and headaches  Hematological: Negative for adenopathy  Psychiatric/Behavioral: Negative for dysphoric mood  The patient is not nervous/anxious  Past Medical History     History reviewed  No pertinent past medical history      Past Surgical History     Past Surgical History:   Procedure Laterality Date    TONSILLECTOMY Last Assessed:14    VASECTOMY      Last Assessed:14       Social History     Social History     Socioeconomic History    Marital status: /Civil Union     Spouse name: None    Number of children: None    Years of education: None    Highest education level: None   Occupational History    None   Tobacco Use    Smoking status: Former Smoker     Quit date:      Years since quittin 7    Smokeless tobacco: Never Used   Vaping Use    Vaping Use: Never used   Substance and Sexual Activity    Alcohol use: No     Comment: former consumption of alcohol    Drug use: Not Currently    Sexual activity: None   Other Topics Concern    None   Social History Narrative    None     Social Determinants of Health     Financial Resource Strain: Not on file   Food Insecurity: Not on file   Transportation Needs: Not on file   Physical Activity: Not on file   Stress: Not on file   Social Connections: Not on file   Intimate Partner Violence: Not on file   Housing Stability: Not on file       Family History     Family History   Problem Relation Age of Onset    Cancer Mother     Cancer Father     Breast cancer Sister     Prostate cancer Other     Heart attack Other         Grandmother    Substance Abuse Neg Hx     Mental illness Neg Hx        Current Medications       Current Outpatient Medications:     metoprolol succinate (TOPROL-XL) 25 mg 24 hr tablet, TAKE ONE TABLET BY MOUTH DAILY, Disp: 90 tablet, Rfl: 1     Allergies     No Known Allergies    Objective     Recent Results (from the past 672 hour(s))   CBC and differential    Collection Time: 22  7:14 AM   Result Value Ref Range    WBC 5 41 4 31 - 10 16 Thousand/uL    RBC 5 94 (H) 3 88 - 5 62 Million/uL    Hemoglobin 17 7 (H) 12 0 - 17 0 g/dL    Hematocrit 53 4 (H) 36 5 - 49 3 %    MCV 90 82 - 98 fL    MCH 29 8 26 8 - 34 3 pg    MCHC 33 1 31 4 - 37 4 g/dL    RDW 12 6 11 6 - 15 1 %    MPV 9 9 8 9 - 12 7 fL    Platelets 406 534 - 377 Thousands/uL    nRBC 0 /100 WBCs    Neutrophils Relative 44 43 - 75 %    Immat GRANS % 0 0 - 2 %    Lymphocytes Relative 42 14 - 44 %    Monocytes Relative 9 4 - 12 %    Eosinophils Relative 3 0 - 6 %    Basophils Relative 2 (H) 0 - 1 %    Neutrophils Absolute 2 40 1 85 - 7 62 Thousands/µL    Immature Grans Absolute 0 02 0 00 - 0 20 Thousand/uL    Lymphocytes Absolute 2 25 0 60 - 4 47 Thousands/µL    Monocytes Absolute 0 49 0 17 - 1 22 Thousand/µL    Eosinophils Absolute 0 17 0 00 - 0 61 Thousand/µL    Basophils Absolute 0 08 0 00 - 0 10 Thousands/µL   Comprehensive metabolic panel    Collection Time: 09/02/22  7:14 AM   Result Value Ref Range    Sodium 136 135 - 147 mmol/L    Potassium 4 2 3 5 - 5 3 mmol/L    Chloride 103 96 - 108 mmol/L    CO2 25 21 - 32 mmol/L    ANION GAP 8 4 - 13 mmol/L    BUN 10 5 - 25 mg/dL    Creatinine 1 20 0 60 - 1 30 mg/dL    Glucose, Fasting 109 (H) 65 - 99 mg/dL    Calcium 9 7 8 3 - 10 1 mg/dL    AST 17 5 - 45 U/L    ALT 32 12 - 78 U/L    Alkaline Phosphatase 80 46 - 116 U/L    Total Protein 8 2 6 4 - 8 4 g/dL    Albumin 4 2 3 5 - 5 0 g/dL    Total Bilirubin 0 63 0 20 - 1 00 mg/dL    eGFR 71 ml/min/1 73sq m   Hemoglobin A1C    Collection Time: 09/02/22  7:14 AM   Result Value Ref Range    Hemoglobin A1C 5 6 Normal 3 8-5 6%; PreDiabetic 5 7-6 4%; Diabetic >=6 5%; Glycemic control for adults with diabetes <7 0% %     mg/dl   Lipid panel    Collection Time: 09/02/22  7:14 AM   Result Value Ref Range    Cholesterol 228 (H) See Comment mg/dL    Triglycerides 142 See Comment mg/dL    HDL, Direct 49 >=40 mg/dL    LDL Calculated 151 (H) 0 - 100 mg/dL    Non-HDL-Chol (CHOL-HDL) 179 mg/dl   Reviewed lab/diagnostic results with pt including both normal and abnormal findings  In depth counseling and instructions given  All questions answered during visit         /78   Pulse 81   Temp 97 6 °F (36 4 °C) (Temporal)   Resp 16   Ht 5' 8 5" (1 74 m)   Wt 70 3 kg (155 lb)   SpO2 97% BMI 23 22 kg/m²      Physical Exam  Constitutional:       General: He is not in acute distress  Appearance: Normal appearance  He is well-developed  He is not ill-appearing  HENT:      Head: Normocephalic and atraumatic  Right Ear: Tympanic membrane and ear canal normal       Left Ear: Tympanic membrane and ear canal normal       Mouth/Throat:      Mouth: Mucous membranes are moist       Pharynx: Oropharynx is clear  Comments: Multiple missing or fractured teeth  Eyes:      General: No scleral icterus  Extraocular Movements: Extraocular movements intact  Pupils: Pupils are equal, round, and reactive to light  Neck:      Thyroid: No thyromegaly  Vascular: No carotid bruit  Cardiovascular:      Rate and Rhythm: Normal rate and regular rhythm  Heart sounds: No murmur heard  Pulmonary:      Effort: Pulmonary effort is normal  No respiratory distress  Breath sounds: Normal breath sounds  No wheezing or rales  Abdominal:      General: Bowel sounds are normal  There is no distension  Palpations: Abdomen is soft  Tenderness: There is no abdominal tenderness  Musculoskeletal:         General: Normal range of motion  Cervical back: Normal range of motion and neck supple  Right lower leg: No edema  Left lower leg: No edema  Lymphadenopathy:      Cervical: No cervical adenopathy  Skin:     General: Skin is warm and dry  Coloration: Skin is not jaundiced or pale  Neurological:      General: No focal deficit present  Mental Status: He is alert and oriented to person, place, and time  Cranial Nerves: No cranial nerve deficit  Sensory: No sensory deficit  Psychiatric:         Mood and Affect: Mood normal          Behavior: Behavior normal          Thought Content:  Thought content normal          Judgment: Judgment normal             Visual Acuity Screening    Right eye Left eye Both eyes   Without correction: 20/20 20/20 20/20 With correction:      Comments: Color pass          Jameson Britton

## 2022-09-16 ENCOUNTER — TELEPHONE (OUTPATIENT)
Dept: FAMILY MEDICINE CLINIC | Facility: CLINIC | Age: 47
End: 2022-09-16

## 2022-09-16 NOTE — TELEPHONE ENCOUNTER
9-16-22    Mr Juarez Fraire received a bill in the amount of $793 20  Insurance indicated it was attributed to deductible  S/W - [facility] [place]  Reference # [id number]    However, patient advised insurance "Saúl Marin advised preventative blood work is covered at 100%  I asked Billing to submit additional codes:  Z13 6  Z13 20  Z13 29    Told patient we will be resubmitting to see if this covers his bill      Wantje Cancer

## 2022-09-21 NOTE — TELEPHONE ENCOUNTER
David Park,    Mr Joe Noriega called his insurance company due to his blood work not being covered  They advised him that preventive blood work is covered at 100% for a physical     Billing has asked that diagnosis codes be submitted on a "script" via FAX in order to resubmit charges  Are you able to assign Z codes for his physical blood work? It has to be on a Script and I will FAX  Screening codes:   Z13 6   Z13 20   Z13 29    Thanks    Ward Overton

## 2022-11-26 DIAGNOSIS — I10 ESSENTIAL HYPERTENSION: ICD-10-CM

## 2022-11-28 RX ORDER — METOPROLOL SUCCINATE 25 MG/1
TABLET, EXTENDED RELEASE ORAL
Qty: 90 TABLET | Refills: 0 | Status: SHIPPED | OUTPATIENT
Start: 2022-11-28

## 2023-03-14 ENCOUNTER — OFFICE VISIT (OUTPATIENT)
Dept: FAMILY MEDICINE CLINIC | Facility: CLINIC | Age: 48
End: 2023-03-14

## 2023-03-14 VITALS
SYSTOLIC BLOOD PRESSURE: 114 MMHG | HEART RATE: 94 BPM | DIASTOLIC BLOOD PRESSURE: 80 MMHG | TEMPERATURE: 96.9 F | WEIGHT: 154 LBS | BODY MASS INDEX: 22.81 KG/M2 | HEIGHT: 69 IN | RESPIRATION RATE: 16 BRPM | OXYGEN SATURATION: 96 %

## 2023-03-14 DIAGNOSIS — E78.2 MIXED HYPERLIPIDEMIA: ICD-10-CM

## 2023-03-14 DIAGNOSIS — I10 ESSENTIAL HYPERTENSION: Primary | ICD-10-CM

## 2023-03-14 DIAGNOSIS — Z00.00 ANNUAL PHYSICAL EXAM: ICD-10-CM

## 2023-03-14 DIAGNOSIS — R73.03 PREDIABETES: ICD-10-CM

## 2023-03-14 RX ORDER — METOPROLOL SUCCINATE 25 MG/1
25 TABLET, EXTENDED RELEASE ORAL DAILY
Qty: 90 TABLET | Refills: 3 | Status: SHIPPED | OUTPATIENT
Start: 2023-03-14

## 2023-03-14 NOTE — PROGRESS NOTES
Name: Delmis Rodrigez      : 1975      MRN: [id number]  Encounter Provider: POLA Solorio  Encounter Date: 3/14/2023   Encounter department: [address] [place] [address]  Essential hypertension  Continue blood pressure medications as ordered  Monitor blood pressure  Limit salt in diet  Heart healthy diet  Will check labs in 6 months with yearly physical  - metoprolol succinate (TOPROL-XL) 25 mg 24 hr tablet; Take 1 tablet (25 mg total) by mouth daily  Dispense: 90 tablet; Refill: 3    2  Mixed hyperlipidemia  Heart healthy diet  Will check labs in 6 months    3  Prediabetes  Carb controlled diet  Regular exercise  Will check labs in 6 months       Patient was counseled regarding instructions for management which included: impression/diagnosis, risk/benefits of treatment options, importance of compliance with treatment, risk factor reduction, and prognosis  I have reviewed the instructions with the patient answering all questions and patient verbalized understanding  Subjective      Here for follow up  On metoprolol for bp  Doing well   Taking med as prescribed  No recent illness  No specific concerns or issues  Review of Systems   Constitutional: Negative for fatigue and unexpected weight change  Respiratory: Negative for chest tightness and shortness of breath  Cardiovascular: Negative for chest pain, palpitations and leg swelling  Gastrointestinal: Negative for abdominal pain, diarrhea, nausea and vomiting  Musculoskeletal: Negative for arthralgias and myalgias  Skin: Negative for rash and wound  Allergic/Immunologic: Negative for immunocompromised state  Neurological: Negative for dizziness and headaches  Psychiatric/Behavioral: Negative for dysphoric mood  The patient is not nervous/anxious          Current Outpatient Medications on File Prior to Visit   Medication Sig   • metoprolol succinate (TOPROL-XL) 25 mg 24 hr tablet TAKE ONE TABLET BY MOUTH DAILY       Objective     /80   Pulse 94   Temp (!) 96 9 °F (36 1 °C) (Temporal)   Resp 16   Ht 5' 8 5" (1 74 m)   Wt 69 9 kg (154 lb)   SpO2 96%   BMI 23 08 kg/m²     Physical Exam  Vitals reviewed  Constitutional:       General: He is not in acute distress  Appearance: Normal appearance  He is normal weight  He is not ill-appearing  Eyes:      General: No scleral icterus  Neck:      Vascular: No carotid bruit  Cardiovascular:      Rate and Rhythm: Normal rate and regular rhythm  Pulmonary:      Effort: Pulmonary effort is normal  No respiratory distress  Breath sounds: Normal breath sounds  No wheezing  Abdominal:      General: There is no distension  Palpations: Abdomen is soft  Musculoskeletal:      Cervical back: Normal range of motion  Right lower leg: No edema  Left lower leg: No edema  Skin:     General: Skin is warm and dry  Coloration: Skin is not jaundiced or pale  Neurological:      General: No focal deficit present  Mental Status: He is alert and oriented to person, place, and time  Cranial Nerves: No cranial nerve deficit  Sensory: No sensory deficit  Psychiatric:         Mood and Affect: Mood normal          Behavior: Behavior normal          Thought Content:  Thought content normal          Judgment: Judgment normal        Jose R Ramp

## 2023-03-14 NOTE — PATIENT INSTRUCTIONS
Continue blood pressure medications as ordered  Monitor blood pressure  Limit salt in diet  Heart healthy diet     Will check labs in 6 months with yearly physical

## 2023-09-13 LAB
ALBUMIN SERPL-MCNC: 4.9 G/DL (ref 4.1–5.1)
ALBUMIN/GLOB SERPL: 1.8 {RATIO} (ref 1.2–2.2)
ALP SERPL-CCNC: 75 IU/L (ref 44–121)
ALT SERPL-CCNC: 17 IU/L (ref 0–44)
AST SERPL-CCNC: 14 IU/L (ref 0–40)
BASOPHILS # BLD AUTO: 0.1 X10E3/UL (ref 0–0.2)
BASOPHILS NFR BLD AUTO: 1 %
BILIRUB SERPL-MCNC: 0.5 MG/DL (ref 0–1.2)
BUN SERPL-MCNC: 12 MG/DL (ref 6–24)
BUN/CREAT SERPL: 10 (ref 9–20)
CALCIUM SERPL-MCNC: 10.1 MG/DL (ref 8.7–10.2)
CHLORIDE SERPL-SCNC: 100 MMOL/L (ref 96–106)
CHOLEST SERPL-MCNC: 276 MG/DL (ref 100–199)
CHOLEST/HDLC SERPL: 5 RATIO (ref 0–5)
CO2 SERPL-SCNC: 24 MMOL/L (ref 20–29)
CREAT SERPL-MCNC: 1.15 MG/DL (ref 0.76–1.27)
CYTOLOGY CMNT CVX/VAG CYTO-IMP: ABNORMAL
EGFR: 79 ML/MIN/1.73
EOSINOPHIL # BLD AUTO: 0.2 X10E3/UL (ref 0–0.4)
EOSINOPHIL NFR BLD AUTO: 3 %
ERYTHROCYTE [DISTWIDTH] IN BLOOD BY AUTOMATED COUNT: 12.4 % (ref 11.6–15.4)
EST. AVERAGE GLUCOSE BLD GHB EST-MCNC: 117 MG/DL
GLOBULIN SER-MCNC: 2.7 G/DL (ref 1.5–4.5)
GLUCOSE SERPL-MCNC: 105 MG/DL (ref 70–99)
HBA1C MFR BLD: 5.7 % (ref 4.8–5.6)
HCT VFR BLD AUTO: 50.4 % (ref 37.5–51)
HDLC SERPL-MCNC: 55 MG/DL
HGB BLD-MCNC: 17.6 G/DL (ref 13–17.7)
IMM GRANULOCYTES # BLD: 0 X10E3/UL (ref 0–0.1)
IMM GRANULOCYTES NFR BLD: 0 %
LDLC SERPL CALC-MCNC: 191 MG/DL (ref 0–99)
LYMPHOCYTES # BLD AUTO: 2.7 X10E3/UL (ref 0.7–3.1)
LYMPHOCYTES NFR BLD AUTO: 44 %
MCH RBC QN AUTO: 30.8 PG (ref 26.6–33)
MCHC RBC AUTO-ENTMCNC: 34.9 G/DL (ref 31.5–35.7)
MCV RBC AUTO: 88 FL (ref 79–97)
MONOCYTES # BLD AUTO: 0.6 X10E3/UL (ref 0.1–0.9)
MONOCYTES NFR BLD AUTO: 10 %
NEUTROPHILS # BLD AUTO: 2.6 X10E3/UL (ref 1.4–7)
NEUTROPHILS NFR BLD AUTO: 42 %
PLATELET # BLD AUTO: 285 X10E3/UL (ref 150–450)
POTASSIUM SERPL-SCNC: 4.2 MMOL/L (ref 3.5–5.2)
PROT SERPL-MCNC: 7.6 G/DL (ref 6–8.5)
RBC # BLD AUTO: 5.72 X10E6/UL (ref 4.14–5.8)
SL AMB VLDL CHOLESTEROL CALC: 30 MG/DL (ref 5–40)
SODIUM SERPL-SCNC: 138 MMOL/L (ref 134–144)
TRIGL SERPL-MCNC: 162 MG/DL (ref 0–149)
WBC # BLD AUTO: 6.1 X10E3/UL (ref 3.4–10.8)

## 2023-09-19 ENCOUNTER — OFFICE VISIT (OUTPATIENT)
Dept: FAMILY MEDICINE CLINIC | Facility: CLINIC | Age: 48
End: 2023-09-19
Payer: COMMERCIAL

## 2023-09-19 VITALS
HEIGHT: 67 IN | BODY MASS INDEX: 23.54 KG/M2 | WEIGHT: 150 LBS | OXYGEN SATURATION: 97 % | HEART RATE: 74 BPM | RESPIRATION RATE: 16 BRPM | SYSTOLIC BLOOD PRESSURE: 114 MMHG | TEMPERATURE: 97 F | DIASTOLIC BLOOD PRESSURE: 78 MMHG

## 2023-09-19 DIAGNOSIS — Z00.00 ANNUAL PHYSICAL EXAM: Primary | ICD-10-CM

## 2023-09-19 DIAGNOSIS — E78.2 MIXED HYPERLIPIDEMIA: ICD-10-CM

## 2023-09-19 PROCEDURE — 99396 PREV VISIT EST AGE 40-64: CPT | Performed by: NURSE PRACTITIONER

## 2023-09-19 RX ORDER — ROSUVASTATIN CALCIUM 10 MG/1
10 TABLET, COATED ORAL DAILY
Qty: 30 TABLET | Refills: 5 | Status: SHIPPED | OUTPATIENT
Start: 2023-09-19

## 2023-09-19 NOTE — PROGRESS NOTES
[facility] HEALTH MAINTENANCE OFFICE VISIT  [facility] - [facility]    NAME: Malgorzata Mansfield  AGE: 50 y.o. SEX: male  : 1975     DATE: 2023    Assessment and Plan   1. Annual physical exam  Heart healthy, carbohydrate controlled diet- limit red meat, limit saturated fat, moderate salt intake, limit junk food, etc.   Regular exercise  Stress management  Routine labwork and screenings as ordered. 2. Mixed hyperlipidemia  Heart healthy diet. Will start statin. Check labs in 6 months. - rosuvastatin (CRESTOR) 10 MG tablet; Take 1 tablet (10 mg total) by mouth daily  Dispense: 30 tablet; Refill: 5  - Comprehensive metabolic panel; Future  - Lipid panel; Future    Patient was counseled regarding instructions for management which included: impression/diagnosis, risk/benefits of treatment options, importance of compliance with treatment, risk factor reduction, and prognosis. I have reviewed the instructions with the patient answering all questions and patient verbalized understanding. · Patient Counseling:   · Nutrition: Stressed importance of a well balanced diet, moderation of sodium/saturated fat, caloric balance and sufficient intake of fiber  · Exercise: Stressed the importance of regular exercise with a goal of 150 minutes per week  · Dental Health: Discussed daily flossing and brushing and regular dental visits     · Immunizations reviewed: Risks and Benefits discussed  · Discussed benefits of:  Colon Cancer Screening and Screening labs. • BMI Counseling: Body mass index is 23.49 kg/m². Discussed with patient's BMI with him. BMI is wnl. Counseled on well balanced diet and regular exericse. •           Chief Complaint     Chief Complaint   Patient presents with   • Physical Exam       History of Present Illness     Here for annual exam  Feels well  No recent illness  No specific concerns or issues.    Has tried taking fish oil in past but was not able to swallow capsules . Willing to start statin if needed. Well Adult Physical   Patient here for a comprehensive physical exam.      Diet and Physical Activity  Diet: well balanced diet  Exercise: daily ; reports does a lot of walking     Depression Screen  PHQ-2/9 Depression Screening    Little interest or pleasure in doing things: 0 - not at all  Feeling down, depressed, or hopeless: 0 - not at all  PHQ-2 Score: 0  PHQ-2 Interpretation: Negative depression screen     Depression Screening Follow-up Plan: Patient's depression screening was negative with a PHQ-2 score of 0. . Clinically patient does not have depression. No treatment is required. General Health  Hearing: Normal:  bilateral  Vision: no vision problems  Dental: no dental visits for >1 year          The following portions of the patient's history were reviewed and updated as appropriate: allergies, current medications, past family history, past medical history, past social history, past surgical history and problem list.    Review of Systems     Review of Systems   Constitutional: Negative for chills, diaphoresis, fatigue and fever. HENT: Negative for congestion, sinus pressure, sinus pain and sore throat. Eyes: Negative for visual disturbance. Respiratory: Negative for cough, chest tightness, shortness of breath and wheezing. Cardiovascular: Negative for chest pain, palpitations and leg swelling. Gastrointestinal: Negative for abdominal distention, abdominal pain, diarrhea and nausea. Endocrine: Negative for polydipsia. Genitourinary: Negative for dysuria, frequency and urgency. Musculoskeletal: Negative for arthralgias, back pain and neck pain. Skin: Negative for rash. Allergic/Immunologic: Negative for immunocompromised state. Neurological: Negative for dizziness, weakness and headaches. Hematological: Negative for adenopathy. Psychiatric/Behavioral: Negative for dysphoric mood.  The patient is not nervous/anxious. Past Medical History     History reviewed. No pertinent past medical history.     Past Surgical History     Past Surgical History:   Procedure Laterality Date   • TONSILLECTOMY      Last Assessed:14   • VASECTOMY      Last Assessed:14       Social History     Social History     Socioeconomic History   • Marital status: /Civil Union     Spouse name: None   • Number of children: None   • Years of education: None   • Highest education level: None   Occupational History   • None   Tobacco Use   • Smoking status: Former     Types: Cigarettes     Quit date:      Years since quittin.7   • Smokeless tobacco: Never   Vaping Use   • Vaping Use: Never used   Substance and Sexual Activity   • Alcohol use: No     Comment: former consumption of alcohol   • Drug use: Not Currently   • Sexual activity: None   Other Topics Concern   • None   Social History Narrative   • None     Social Determinants of Health     Financial Resource Strain: Not on file   Food Insecurity: Not on file   Transportation Needs: Not on file   Physical Activity: Not on file   Stress: Not on file   Social Connections: Not on file   Intimate Partner Violence: Not on file   Housing Stability: Not on file       Family History     Family History   Problem Relation Age of Onset   • Cancer Mother    • Cancer Father    • Breast cancer Sister    • Prostate cancer Other    • Heart attack Other         Grandmother   • Substance Abuse Neg Hx    • Mental illness Neg Hx        Current Medications       Current Outpatient Medications:   •  metoprolol succinate (TOPROL-XL) 25 mg 24 hr tablet, Take 1 tablet (25 mg total) by mouth daily, Disp: 90 tablet, Rfl: 3     Allergies     No Known Allergies    Objective     Recent Results (from the past 672 hour(s))   CBC and differential    Collection Time: 23  8:08 AM   Result Value Ref Range    White Blood Cell Count 6.1 3.4 - 10.8 x10E3/uL    Red Blood Cell Count 5.72 4.14 - 5.80 x10E6/uL    Hemoglobin 17.6 13.0 - 17.7 g/dL    HCT 50.4 37.5 - 51.0 %    MCV 88 79 - 97 fL    MCH 30.8 26.6 - 33.0 pg    MCHC 34.9 31.5 - 35.7 g/dL    RDW 12.4 11.6 - 15.4 %    Platelet Count 063 949 - 450 x10E3/uL    Neutrophils 42 Not Estab. %    Lymphocytes 44 Not Estab. %    Monocytes 10 Not Estab. %    Eosinophils 3 Not Estab. %    Basophils PCT 1 Not Estab. %    Neutrophils (Absolute) 2.6 1.4 - 7.0 x10E3/uL    Lymphocytes (Absolute) 2.7 0.7 - 3.1 x10E3/uL    Monocytes (Absolute) 0.6 0.1 - 0.9 x10E3/uL    Eosinophils (Absolute) 0.2 0.0 - 0.4 x10E3/uL    Basophils ABS 0.1 0.0 - 0.2 x10E3/uL    Immature Granulocytes 0 Not Estab. %    Immature Granulocytes (Absolute) 0.0 0.0 - 0.1 x10E3/uL   Comprehensive metabolic panel    Collection Time: 09/12/23  8:08 AM   Result Value Ref Range    Glucose, Random 105 (H) 70 - 99 mg/dL    BUN 12 6 - 24 mg/dL    Creatinine 1.15 0.76 - 1.27 mg/dL    eGFR 79 >59 mL/min/1.73    SL AMB BUN/CREATININE RATIO 10 9 - 20    Sodium 138 134 - 144 mmol/L    Potassium 4.2 3.5 - 5.2 mmol/L    Chloride 100 96 - 106 mmol/L    CO2 24 20 - 29 mmol/L    CALCIUM 10.1 8.7 - 10.2 mg/dL    Protein, Total 7.6 6.0 - 8.5 g/dL    Albumin 4.9 4.1 - 5.1 g/dL    Globulin, Total 2.7 1.5 - 4.5 g/dL    Albumin/Globulin Ratio 1.8 1.2 - 2.2    TOTAL BILIRUBIN 0.5 0.0 - 1.2 mg/dL    Alk Phos Isoenzymes 75 44 - 121 IU/L    AST 14 0 - 40 IU/L    ALT 17 0 - 44 IU/L   Hemoglobin A1C    Collection Time: 09/12/23  8:08 AM   Result Value Ref Range    Hemoglobin A1C 5.7 (H) 4.8 - 5.6 %    Estimated Average Glucose 117 mg/dL   Lipid panel    Collection Time: 09/12/23  8:08 AM   Result Value Ref Range    Cholesterol, Total 276 (H) 100 - 199 mg/dL    Triglycerides 162 (H) 0 - 149 mg/dL    HDL 55 >39 mg/dL    VLDL Cholesterol Calculated 30 5 - 40 mg/dL    LDL Calculated 191 (H) 0 - 99 mg/dL    Comment Comment     T.  Chol/HDL Ratio 5.0 0.0 - 5.0 ratio   9/22/2022 labs  Chol 228, tg 142, hdl 49, ldl 151  A1C 5.6    Reviewed lab/diagnostic results with pt including both normal and abnormal findings. In depth counseling and instructions given. All questions answered during visit. /78 (BP Location: Right arm, Patient Position: Sitting, Cuff Size: Extra-Large)   Pulse 74   Temp (!) 97 °F (36.1 °C) (Temporal)   Resp 16   Ht 5' 7" (1.702 m)   Wt 68 kg (150 lb)   SpO2 97%   BMI 23.49 kg/m²      Physical Exam  Vitals reviewed. Constitutional:       General: He is not in acute distress. Appearance: Normal appearance. He is well-developed. He is not ill-appearing. HENT:      Head: Normocephalic and atraumatic. Right Ear: Tympanic membrane and ear canal normal.      Left Ear: Tympanic membrane and ear canal normal.      Nose: Nose normal.      Mouth/Throat:      Mouth: Mucous membranes are moist.      Pharynx: Oropharynx is clear. Eyes:      General: No scleral icterus. Extraocular Movements: Extraocular movements intact. Pupils: Pupils are equal, round, and reactive to light. Neck:      Thyroid: No thyromegaly. Cardiovascular:      Rate and Rhythm: Normal rate and regular rhythm. Heart sounds: No murmur heard. Pulmonary:      Effort: Pulmonary effort is normal. No respiratory distress. Breath sounds: Normal breath sounds. No wheezing or rales. Abdominal:      General: Bowel sounds are normal. There is no distension. Palpations: Abdomen is soft. Tenderness: There is no abdominal tenderness. Musculoskeletal:         General: Normal range of motion. Cervical back: Normal range of motion and neck supple. Right lower leg: No edema. Left lower leg: No edema. Lymphadenopathy:      Cervical: No cervical adenopathy. Skin:     General: Skin is warm and dry. Coloration: Skin is not jaundiced or pale. Neurological:      General: No focal deficit present. Mental Status: He is alert and oriented to person, place, and time.       Cranial Nerves: No cranial nerve deficit. Sensory: No sensory deficit. Psychiatric:         Mood and Affect: Mood normal.         Behavior: Behavior normal.         Thought Content:  Thought content normal.         Judgment: Judgment normal.           Vision Screening    Right eye Left eye Both eyes   Without correction 20 /30 20/25 20/30   With correction      Comments: Color chart accurate - dlsaeed, gio Le, 1926 [facility]

## 2023-09-19 NOTE — PATIENT INSTRUCTIONS
Heart healthy, carbohydrate controlled diet- limit red meat, limit saturated fat, moderate salt intake, limit junk food, etc.   Regular exercise  Stress management  Routine labwork and screenings as ordered. Will start medication for cholesterol. Rosuvastatin 10mg daily. Will repeat labwork in 6 months.    Continue blood pressure medication

## 2024-02-24 DIAGNOSIS — E78.2 MIXED HYPERLIPIDEMIA: ICD-10-CM

## 2024-02-24 RX ORDER — ROSUVASTATIN CALCIUM 10 MG/1
10 TABLET, COATED ORAL DAILY
Qty: 30 TABLET | Refills: 4 | Status: SHIPPED | OUTPATIENT
Start: 2024-02-24

## 2024-03-10 LAB
ALBUMIN SERPL-MCNC: 4.8 G/DL (ref 4.1–5.1)
ALBUMIN/GLOB SERPL: 1.8 {RATIO} (ref 1.2–2.2)
ALP SERPL-CCNC: 80 IU/L (ref 44–121)
ALT SERPL-CCNC: 40 IU/L (ref 0–44)
AST SERPL-CCNC: 27 IU/L (ref 0–40)
BILIRUB SERPL-MCNC: 0.6 MG/DL (ref 0–1.2)
BUN SERPL-MCNC: 8 MG/DL (ref 6–24)
BUN/CREAT SERPL: 8 (ref 9–20)
CALCIUM SERPL-MCNC: 9.7 MG/DL (ref 8.7–10.2)
CHLORIDE SERPL-SCNC: 101 MMOL/L (ref 96–106)
CHOLEST SERPL-MCNC: 192 MG/DL (ref 100–199)
CHOLEST/HDLC SERPL: 3.1 RATIO (ref 0–5)
CO2 SERPL-SCNC: 23 MMOL/L (ref 20–29)
CREAT SERPL-MCNC: 0.96 MG/DL (ref 0.76–1.27)
EGFR: 98 ML/MIN/1.73
GLOBULIN SER-MCNC: 2.6 G/DL (ref 1.5–4.5)
GLUCOSE SERPL-MCNC: 104 MG/DL (ref 70–99)
HDLC SERPL-MCNC: 61 MG/DL
LDLC SERPL CALC-MCNC: 109 MG/DL (ref 0–99)
POTASSIUM SERPL-SCNC: 4.2 MMOL/L (ref 3.5–5.2)
PROT SERPL-MCNC: 7.4 G/DL (ref 6–8.5)
SL AMB VLDL CHOLESTEROL CALC: 22 MG/DL (ref 5–40)
SODIUM SERPL-SCNC: 142 MMOL/L (ref 134–144)
TRIGL SERPL-MCNC: 126 MG/DL (ref 0–149)

## 2024-03-19 ENCOUNTER — OFFICE VISIT (OUTPATIENT)
Dept: FAMILY MEDICINE CLINIC | Facility: CLINIC | Age: 49
End: 2024-03-19
Payer: COMMERCIAL

## 2024-03-19 VITALS
TEMPERATURE: 97.5 F | RESPIRATION RATE: 16 BRPM | OXYGEN SATURATION: 97 % | WEIGHT: 164 LBS | SYSTOLIC BLOOD PRESSURE: 124 MMHG | HEIGHT: 67 IN | HEART RATE: 84 BPM | BODY MASS INDEX: 25.74 KG/M2 | DIASTOLIC BLOOD PRESSURE: 80 MMHG

## 2024-03-19 DIAGNOSIS — R73.03 PREDIABETES: Primary | ICD-10-CM

## 2024-03-19 DIAGNOSIS — I10 ESSENTIAL HYPERTENSION: ICD-10-CM

## 2024-03-19 DIAGNOSIS — E78.2 MIXED HYPERLIPIDEMIA: ICD-10-CM

## 2024-03-19 DIAGNOSIS — I10 ESSENTIAL HYPERTENSION: Primary | ICD-10-CM

## 2024-03-19 DIAGNOSIS — Z00.00 ANNUAL PHYSICAL EXAM: ICD-10-CM

## 2024-03-19 PROCEDURE — 99214 OFFICE O/P EST MOD 30 MIN: CPT | Performed by: NURSE PRACTITIONER

## 2024-03-19 RX ORDER — ROSUVASTATIN CALCIUM 10 MG/1
10 TABLET, COATED ORAL DAILY
Qty: 90 TABLET | Refills: 1 | Status: SHIPPED | OUTPATIENT
Start: 2024-03-19

## 2024-03-19 NOTE — PATIENT INSTRUCTIONS
Heart healthy, carbohydrate controlled diet- limit red meat, limit saturated fat, moderate salt intake, limit junk food, etc.   Regular exercise  Stress management  Routine labwork and screenings as ordered.   Continue current medications  Will check labs again in 6 months

## 2024-03-19 NOTE — PROGRESS NOTES
Name: Chris Mendez      : 1975      MRN: [id number]  Encounter Provider: POLA Luevano  Encounter Date: 3/19/2024   Encounter department: [facility]    Assessment & Plan   1. Essential hypertension  Stable. Continue blood pressure medications as ordered.   Monitor blood pressure. Stress management. Regular exercise  Limit salt in diet.     2. Mixed hyperlipidemia  Heart healthy diet. Continue statin. Regular exercise. Will repeat labs in 6 months.   - rosuvastatin (CRESTOR) 10 MG tablet; Take 1 tablet (10 mg total) by mouth daily  Dispense: 90 tablet; Refill: 1           Subjective      Here for follow up, lab review, med check  Seen in office in September and started on statin   Tolerating with no side effects  Taking med for chol and bp as prescribed.   Feels well.   No recent illness  No other issues or concerns.         Review of Systems   Constitutional:  Negative for fatigue and unexpected weight change.   Respiratory:  Negative for chest tightness and shortness of breath.    Cardiovascular:  Negative for chest pain, palpitations and leg swelling.   Gastrointestinal:  Negative for abdominal pain.   Musculoskeletal:  Negative for arthralgias and myalgias.   Skin:  Negative for rash.   Allergic/Immunologic: Negative for immunocompromised state.   Neurological:  Negative for dizziness and headaches.   Psychiatric/Behavioral:  Negative for dysphoric mood. The patient is not nervous/anxious.        Current Outpatient Medications on File Prior to Visit   Medication Sig    metoprolol succinate (TOPROL-XL) 25 mg 24 hr tablet Take 1 tablet (25 mg total) by mouth daily    rosuvastatin (CRESTOR) 10 MG tablet TAKE 1 TABLET (10 MG TOTAL) BY MOUTH DAILY       Objective     Recent Results (from the past 672 hour(s))   Comprehensive metabolic panel    Collection Time: 24  7:19 AM   Result Value Ref Range    Glucose, Random 104 (H) 70 - 99 mg/dL    BUN 8 6 - 24 mg/dL     "Creatinine 0.96 0.76 - 1.27 mg/dL    eGFR 98 >59 mL/min/1.73    SL AMB BUN/CREATININE RATIO 8 (L) 9 - 20    Sodium 142 134 - 144 mmol/L    Potassium 4.2 3.5 - 5.2 mmol/L    Chloride 101 96 - 106 mmol/L    CO2 23 20 - 29 mmol/L    CALCIUM 9.7 8.7 - 10.2 mg/dL    Protein, Total 7.4 6.0 - 8.5 g/dL    Albumin 4.8 4.1 - 5.1 g/dL    Globulin, Total 2.6 1.5 - 4.5 g/dL    Albumin/Globulin Ratio 1.8 1.2 - 2.2    TOTAL BILIRUBIN 0.6 0.0 - 1.2 mg/dL    Alk Phos Isoenzymes 80 44 - 121 IU/L    AST 27 0 - 40 IU/L    ALT 40 0 - 44 IU/L   Lipid panel    Collection Time: 03/09/24  7:19 AM   Result Value Ref Range    Cholesterol, Total 192 100 - 199 mg/dL    Triglycerides 126 0 - 149 mg/dL    HDL 61 >39 mg/dL    VLDL Cholesterol Calculated 22 5 - 40 mg/dL    LDL Calculated 109 (H) 0 - 99 mg/dL    T. Chol/HDL Ratio 3.1 0.0 - 5.0 ratio   Labs 9/2023  Chol 276, tg 162,   Reviewed lab/diagnostic results with pt including both normal and abnormal findings.   In depth counseling and instructions given. All questions answered during visit.       /80 (BP Location: Right arm, Patient Position: Sitting, Cuff Size: Standard)   Pulse 84   Temp 97.5 °F (36.4 °C)   Resp 16   Ht 5' 7\" (1.702 m)   Wt 74.4 kg (164 lb)   SpO2 97%   BMI 25.69 kg/m²     Physical Exam  Vitals reviewed.   Constitutional:       General: He is not in acute distress.     Appearance: He is not ill-appearing.   Eyes:      Extraocular Movements: Extraocular movements intact.      Pupils: Pupils are equal, round, and reactive to light.   Neck:      Vascular: No carotid bruit.   Cardiovascular:      Rate and Rhythm: Normal rate and regular rhythm.   Pulmonary:      Effort: Pulmonary effort is normal. No respiratory distress.      Breath sounds: Normal breath sounds. No wheezing or rales.   Abdominal:      General: There is no distension.      Palpations: Abdomen is soft.   Musculoskeletal:      Cervical back: Normal range of motion.      Right lower leg: No " edema.      Left lower leg: No edema.   Skin:     General: Skin is warm and dry.      Coloration: Skin is not jaundiced or pale.   Neurological:      General: No focal deficit present.      Mental Status: He is alert and oriented to person, place, and time.      Cranial Nerves: No cranial nerve deficit.      Sensory: No sensory deficit.   Psychiatric:         Mood and Affect: Mood normal.         Behavior: Behavior normal.         Thought Content: Thought content normal.         Judgment: Judgment normal.       POLA Luevano

## 2024-04-16 DIAGNOSIS — I10 ESSENTIAL HYPERTENSION: ICD-10-CM

## 2024-04-17 RX ORDER — METOPROLOL SUCCINATE 25 MG/1
25 TABLET, EXTENDED RELEASE ORAL DAILY
Qty: 90 TABLET | Refills: 1 | Status: SHIPPED | OUTPATIENT
Start: 2024-04-17

## 2024-09-13 LAB
ALBUMIN SERPL-MCNC: 4.7 G/DL (ref 4.1–5.1)
ALP SERPL-CCNC: 79 IU/L (ref 44–121)
ALT SERPL-CCNC: 32 IU/L (ref 0–44)
AST SERPL-CCNC: 24 IU/L (ref 0–40)
BASOPHILS # BLD AUTO: 0.1 X10E3/UL (ref 0–0.2)
BASOPHILS NFR BLD AUTO: 1 %
BILIRUB SERPL-MCNC: 0.9 MG/DL (ref 0–1.2)
BUN SERPL-MCNC: 15 MG/DL (ref 6–24)
BUN/CREAT SERPL: 13 (ref 9–20)
CALCIUM SERPL-MCNC: 10.1 MG/DL (ref 8.7–10.2)
CHLORIDE SERPL-SCNC: 101 MMOL/L (ref 96–106)
CHOLEST SERPL-MCNC: 182 MG/DL (ref 100–199)
CHOLEST/HDLC SERPL: 3.6 RATIO (ref 0–5)
CO2 SERPL-SCNC: 24 MMOL/L (ref 20–29)
CREAT SERPL-MCNC: 1.2 MG/DL (ref 0.76–1.27)
EGFR: 74 ML/MIN/1.73
EOSINOPHIL # BLD AUTO: 0.3 X10E3/UL (ref 0–0.4)
EOSINOPHIL NFR BLD AUTO: 5 %
ERYTHROCYTE [DISTWIDTH] IN BLOOD BY AUTOMATED COUNT: 12.5 % (ref 11.6–15.4)
EST. AVERAGE GLUCOSE BLD GHB EST-MCNC: 123 MG/DL
GLOBULIN SER-MCNC: 2.7 G/DL (ref 1.5–4.5)
GLUCOSE SERPL-MCNC: 103 MG/DL (ref 70–99)
HBA1C MFR BLD: 5.9 % (ref 4.8–5.6)
HCT VFR BLD AUTO: 50.6 % (ref 37.5–51)
HDLC SERPL-MCNC: 51 MG/DL
HGB BLD-MCNC: 17.1 G/DL (ref 13–17.7)
IMM GRANULOCYTES # BLD: 0 X10E3/UL (ref 0–0.1)
IMM GRANULOCYTES NFR BLD: 0 %
LDLC SERPL CALC-MCNC: 110 MG/DL (ref 0–99)
LYMPHOCYTES # BLD AUTO: 2.7 X10E3/UL (ref 0.7–3.1)
LYMPHOCYTES NFR BLD AUTO: 44 %
MCH RBC QN AUTO: 30.5 PG (ref 26.6–33)
MCHC RBC AUTO-ENTMCNC: 33.8 G/DL (ref 31.5–35.7)
MCV RBC AUTO: 90 FL (ref 79–97)
MONOCYTES # BLD AUTO: 0.5 X10E3/UL (ref 0.1–0.9)
MONOCYTES NFR BLD AUTO: 9 %
NEUTROPHILS # BLD AUTO: 2.4 X10E3/UL (ref 1.4–7)
NEUTROPHILS NFR BLD AUTO: 41 %
PLATELET # BLD AUTO: 243 X10E3/UL (ref 150–450)
POTASSIUM SERPL-SCNC: 4.6 MMOL/L (ref 3.5–5.2)
PROT SERPL-MCNC: 7.4 G/DL (ref 6–8.5)
RBC # BLD AUTO: 5.6 X10E6/UL (ref 4.14–5.8)
SL AMB VLDL CHOLESTEROL CALC: 21 MG/DL (ref 5–40)
SODIUM SERPL-SCNC: 139 MMOL/L (ref 134–144)
TRIGL SERPL-MCNC: 119 MG/DL (ref 0–149)
WBC # BLD AUTO: 6 X10E3/UL (ref 3.4–10.8)

## 2024-09-24 ENCOUNTER — OFFICE VISIT (OUTPATIENT)
Dept: FAMILY MEDICINE CLINIC | Facility: CLINIC | Age: 49
End: 2024-09-24
Payer: COMMERCIAL

## 2024-09-24 VITALS
HEART RATE: 63 BPM | DIASTOLIC BLOOD PRESSURE: 82 MMHG | RESPIRATION RATE: 18 BRPM | HEIGHT: 67 IN | TEMPERATURE: 97.3 F | WEIGHT: 163.8 LBS | SYSTOLIC BLOOD PRESSURE: 126 MMHG | BODY MASS INDEX: 25.71 KG/M2

## 2024-09-24 DIAGNOSIS — E78.2 MIXED HYPERLIPIDEMIA: ICD-10-CM

## 2024-09-24 DIAGNOSIS — I10 ESSENTIAL HYPERTENSION: ICD-10-CM

## 2024-09-24 DIAGNOSIS — Z12.11 SCREENING FOR MALIGNANT NEOPLASM OF COLON: ICD-10-CM

## 2024-09-24 DIAGNOSIS — Z00.00 ANNUAL PHYSICAL EXAM: Primary | ICD-10-CM

## 2024-09-24 PROCEDURE — 99396 PREV VISIT EST AGE 40-64: CPT | Performed by: NURSE PRACTITIONER

## 2024-09-24 RX ORDER — METOPROLOL SUCCINATE 25 MG/1
25 TABLET, EXTENDED RELEASE ORAL DAILY
Qty: 90 TABLET | Refills: 1 | Status: SHIPPED | OUTPATIENT
Start: 2024-09-24

## 2024-09-24 RX ORDER — ROSUVASTATIN CALCIUM 10 MG/1
10 TABLET, COATED ORAL DAILY
Qty: 90 TABLET | Refills: 1 | Status: SHIPPED | OUTPATIENT
Start: 2024-09-24

## 2024-09-24 NOTE — PROGRESS NOTES
[facility] HEALTH MAINTENANCE OFFICE VISIT  [facility] - [facility] PRACTICE    NAME: Chris Mendez  AGE: 49 y.o. SEX: male  : 1975     DATE: 2024    Assessment and Plan   1. Annual physical exam  Heart healthy, carbohydrate controlled diet- limit red meat, limit saturated fat, moderate salt intake, limit junk food, etc.   Regular exercise  Stress management  Routine labwork and screenings as ordered.       2. Screening for malignant neoplasm of colon  - Cologuard        Patient Counseling:   Nutrition: Stressed importance of a well balanced diet, moderation of sodium/saturated fat, caloric balance and sufficient intake of fiber  Exercise: Stressed the importance of regular exercise with a goal of 150 minutes per week  Dental Health: Discussed daily flossing and brushing and regular dental visits     Immunizations reviewed: Risks and Benefits discussed  Discussed benefits of:  Colon Cancer Screening, Prostate Cancer Screening , and Screening labs.  BMI Counseling: Body mass index is 25.65 kg/m². Discussed with patient's BMI with him. The BMI is above normal. Exercise recommendations include exercising 3-5 times per week.            Chief Complaint     Chief Complaint   Patient presents with    Physical Exam       History of Present Illness     Here for annual exam and lab review  No recent illness  Taking meds for blood pressure and cholesterol   No specific concerns or issues.             Well Adult Physical   Patient here for a comprehensive physical exam.      Diet and Physical Activity  Diet: well balanced diet  Exercise: several times per week      Depression Screen  PHQ-2/9 Depression Screening    Little interest or pleasure in doing things: 0 - not at all  Feeling down, depressed, or hopeless: 0 - not at all  PHQ-2 Score: 0  PHQ-2 Interpretation: Negative depression screen       Depression Screening Follow-up Plan: Patient's depression screening was  negative with a PHQ-2 score of 0. Clinically patient does not have depression. No treatment is required.       General Health  Hearing: Normal:  bilateral  Vision: most recent eye exam >1 year; encouraged to get eye exam   Dental: no dental visits for >1 year; encouraged to schedule dental exam          The following portions of the patient's history were reviewed and updated as appropriate: allergies, current medications, past family history, past medical history, past social history, past surgical history and problem list.    Review of Systems     Review of Systems   Constitutional:  Negative for chills, diaphoresis, fatigue and fever.   HENT:  Negative for congestion, sinus pressure, sinus pain and sore throat.    Eyes:  Negative for visual disturbance.   Respiratory:  Negative for cough, chest tightness, shortness of breath and wheezing.    Cardiovascular:  Negative for chest pain, palpitations and leg swelling.   Gastrointestinal:  Negative for abdominal distention, abdominal pain, diarrhea and nausea.   Genitourinary:  Negative for dysuria, frequency and urgency.   Musculoskeletal:  Negative for arthralgias, back pain and neck pain.   Skin:  Negative for rash.   Allergic/Immunologic: Negative for immunocompromised state.   Neurological:  Negative for dizziness, weakness and headaches.   Hematological:  Negative for adenopathy.   Psychiatric/Behavioral:  Negative for dysphoric mood. The patient is not nervous/anxious.        Past Medical History     History reviewed. No pertinent past medical history.    Past Surgical History     Past Surgical History:   Procedure Laterality Date    TONSILLECTOMY      Last Assessed:6/25/14    VASECTOMY      Last Assessed:6/25/14       Social History     Social History     Socioeconomic History    Marital status: /Civil Union     Spouse name: None    Number of children: None    Years of education: None    Highest education level: None   Occupational History    None    Tobacco Use    Smoking status: Former     Current packs/day: 0.00     Types: Cigarettes     Quit date:      Years since quittin.7     Passive exposure: Current    Smokeless tobacco: Never   Vaping Use    Vaping status: Never Used   Substance and Sexual Activity    Alcohol use: No     Comment: former consumption of alcohol    Drug use: Never    Sexual activity: Yes     Partners: Female   Other Topics Concern    None   Social History Narrative    None     Social Determinants of Health     Financial Resource Strain: Not on file   Food Insecurity: Not on file   Transportation Needs: Not on file   Physical Activity: Not on file   Stress: Not on file   Social Connections: Not on file   Intimate Partner Violence: Not on file   Housing Stability: Not on file       Family History     Family History   Problem Relation Age of Onset    Cancer Mother     Cancer Father     Breast cancer Sister     Prostate cancer Other     Heart attack Other         Grandmother    Cancer Maternal Grandfather     Heart disease Maternal Grandmother     Cancer Maternal Grandmother     Cancer Maternal Uncle     Cancer Sister         Kidney cancer    Substance Abuse Neg Hx     Mental illness Neg Hx        Current Medications       Current Outpatient Medications:     metoprolol succinate (TOPROL-XL) 25 mg 24 hr tablet, TAKE 1 TABLET (25 MG TOTAL) BY MOUTH DAILY, Disp: 90 tablet, Rfl: 1    rosuvastatin (CRESTOR) 10 MG tablet, Take 1 tablet (10 mg total) by mouth daily, Disp: 90 tablet, Rfl: 1     Allergies     No Known Allergies    Objective     Recent Results (from the past 672 hour(s))   CBC and differential    Collection Time: 24  7:03 AM   Result Value Ref Range    White Blood Cell Count 6.0 3.4 - 10.8 x10E3/uL    Red Blood Cell Count 5.60 4.14 - 5.80 x10E6/uL    Hemoglobin 17.1 13.0 - 17.7 g/dL    HCT 50.6 37.5 - 51.0 %    MCV 90 79 - 97 fL    MCH 30.5 26.6 - 33.0 pg    MCHC 33.8 31.5 - 35.7 g/dL    RDW 12.5 11.6 - 15.4 %     "Platelet Count 243 150 - 450 x10E3/uL    Neutrophils 41 Not Estab. %    Lymphocytes 44 Not Estab. %    Monocytes 9 Not Estab. %    Eosinophils 5 Not Estab. %    Basophils PCT 1 Not Estab. %    Neutrophils (Absolute) 2.4 1.4 - 7.0 x10E3/uL    Lymphocytes (Absolute) 2.7 0.7 - 3.1 x10E3/uL    Monocytes (Absolute) 0.5 0.1 - 0.9 x10E3/uL    Eosinophils (Absolute) 0.3 0.0 - 0.4 x10E3/uL    Basophils ABS 0.1 0.0 - 0.2 x10E3/uL    Immature Granulocytes 0 Not Estab. %    Immature Granulocytes (Absolute) 0.0 0.0 - 0.1 x10E3/uL   Comprehensive metabolic panel    Collection Time: 09/13/24  7:03 AM   Result Value Ref Range    Glucose, Random 103 (H) 70 - 99 mg/dL    BUN 15 6 - 24 mg/dL    Creatinine 1.20 0.76 - 1.27 mg/dL    eGFR 74 >59 mL/min/1.73    SL AMB BUN/CREATININE RATIO 13 9 - 20    Sodium 139 134 - 144 mmol/L    Potassium 4.6 3.5 - 5.2 mmol/L    Chloride 101 96 - 106 mmol/L    CO2 24 20 - 29 mmol/L    CALCIUM 10.1 8.7 - 10.2 mg/dL    Protein, Total 7.4 6.0 - 8.5 g/dL    Albumin 4.7 4.1 - 5.1 g/dL    Globulin, Total 2.7 1.5 - 4.5 g/dL    TOTAL BILIRUBIN 0.9 0.0 - 1.2 mg/dL    Alk Phos Isoenzymes 79 44 - 121 IU/L    AST 24 0 - 40 IU/L    ALT 32 0 - 44 IU/L   Hemoglobin A1C    Collection Time: 09/13/24  7:03 AM   Result Value Ref Range    Hemoglobin A1C 5.9 (H) 4.8 - 5.6 %    Estimated Average Glucose 123 mg/dL   Lipid panel    Collection Time: 09/13/24  7:03 AM   Result Value Ref Range    Cholesterol, Total 182 100 - 199 mg/dL    Triglycerides 119 0 - 149 mg/dL    HDL 51 >39 mg/dL    VLDL Cholesterol Calculated 21 5 - 40 mg/dL    LDL Calculated 110 (H) 0 - 99 mg/dL    T. Chol/HDL Ratio 3.6 0.0 - 5.0 ratio     Reviewed lab/diagnostic results with pt including both normal and abnormal findings.   In depth counseling and instructions given. All questions answered during visit.     /82   Pulse 63   Temp (!) 97.3 °F (36.3 °C)   Resp 18   Ht 5' 7\" (1.702 m)   Wt 74.3 kg (163 lb 12.8 oz)   BMI 25.65 kg/m²      " Physical Exam  Vitals reviewed.   Constitutional:       General: He is not in acute distress.     Appearance: Normal appearance. He is well-developed. He is not ill-appearing.   HENT:      Head: Normocephalic and atraumatic.      Right Ear: Tympanic membrane and ear canal normal.      Left Ear: Tympanic membrane and ear canal normal.      Nose: Nose normal.      Mouth/Throat:      Mouth: Mucous membranes are moist.      Pharynx: Oropharynx is clear.   Eyes:      General: No scleral icterus.     Extraocular Movements: Extraocular movements intact.      Pupils: Pupils are equal, round, and reactive to light.   Neck:      Thyroid: No thyromegaly.      Vascular: No carotid bruit.   Cardiovascular:      Rate and Rhythm: Normal rate and regular rhythm.      Heart sounds: No murmur heard.  Pulmonary:      Effort: Pulmonary effort is normal. No respiratory distress.      Breath sounds: Normal breath sounds. No wheezing or rales.   Abdominal:      General: Bowel sounds are normal. There is no distension.      Palpations: Abdomen is soft.      Tenderness: There is no abdominal tenderness.   Musculoskeletal:         General: Normal range of motion.      Cervical back: Normal range of motion and neck supple.      Right lower leg: No edema.      Left lower leg: No edema.   Lymphadenopathy:      Cervical: No cervical adenopathy.   Skin:     General: Skin is warm and dry.      Coloration: Skin is not jaundiced or pale.   Neurological:      General: No focal deficit present.      Mental Status: He is alert and oriented to person, place, and time.      Cranial Nerves: No cranial nerve deficit.      Sensory: No sensory deficit.      Coordination: Coordination normal.      Gait: Gait normal.   Psychiatric:         Mood and Affect: Mood normal.         Behavior: Behavior normal.         Thought Content: Thought content normal.         Judgment: Judgment normal.           Vision Screening    Right eye Left eye Both eyes   Without  correction 20/50 20/70 20/50   With correction              POLA Luevano  [facility]

## 2024-09-24 NOTE — PATIENT INSTRUCTIONS
Heart healthy, carbohydrate controlled diet- limit red meat, limit saturated fat, moderate salt intake, limit junk food, etc.   Regular exercise  Stress management  Routine labwork and screenings as ordered.   Elsy has been ordered for colon cancer screening.

## 2024-10-24 ENCOUNTER — TELEPHONE (OUTPATIENT)
Dept: FAMILY MEDICINE CLINIC | Facility: CLINIC | Age: 49
End: 2024-10-24

## 2024-10-24 LAB — COLOGUARD RESULT REPORTABLE: NEGATIVE

## 2024-10-24 NOTE — TELEPHONE ENCOUNTER
----- Message from POLA Luevano sent at 10/24/2024  7:21 AM EDT -----  Please call pt and advise that yajaira was negative. Repeat in 3 years.    
LMOM machine for patient to call us back  Okay to give message when patient calls back   
The patient called back the message was given    
NEGATIVE

## 2024-12-23 DIAGNOSIS — I10 ESSENTIAL HYPERTENSION: ICD-10-CM

## 2024-12-24 RX ORDER — METOPROLOL SUCCINATE 25 MG/1
25 TABLET, EXTENDED RELEASE ORAL DAILY
Qty: 90 TABLET | Refills: 1 | Status: SHIPPED | OUTPATIENT
Start: 2024-12-24

## 2025-03-25 ENCOUNTER — OFFICE VISIT (OUTPATIENT)
Dept: FAMILY MEDICINE CLINIC | Facility: CLINIC | Age: 50
End: 2025-03-25
Payer: COMMERCIAL

## 2025-03-25 VITALS
TEMPERATURE: 97.2 F | RESPIRATION RATE: 18 BRPM | WEIGHT: 166.2 LBS | BODY MASS INDEX: 26.09 KG/M2 | OXYGEN SATURATION: 97 % | DIASTOLIC BLOOD PRESSURE: 80 MMHG | HEART RATE: 76 BPM | SYSTOLIC BLOOD PRESSURE: 124 MMHG | HEIGHT: 67 IN

## 2025-03-25 DIAGNOSIS — R73.03 PREDIABETES: ICD-10-CM

## 2025-03-25 DIAGNOSIS — I10 ESSENTIAL HYPERTENSION: Primary | ICD-10-CM

## 2025-03-25 DIAGNOSIS — E78.2 MIXED HYPERLIPIDEMIA: ICD-10-CM

## 2025-03-25 PROCEDURE — 99214 OFFICE O/P EST MOD 30 MIN: CPT | Performed by: NURSE PRACTITIONER

## 2025-03-25 NOTE — ASSESSMENT & PLAN NOTE
Stable. Monitor.   Educated on heart healthy diet, carb control, limit salt, limit red meats and junk food.  Routine labs ordered.   Orders:    CBC and differential; Future    Comprehensive metabolic panel; Future    Hemoglobin A1C; Future

## 2025-03-25 NOTE — ASSESSMENT & PLAN NOTE
Stable. Taking medication as prescribed.  Monitor. Stress management.   Routine labs ordered.   Orders:    CBC and differential; Future    Comprehensive metabolic panel; Future

## 2025-03-25 NOTE — PROGRESS NOTES
Name: Chris Mendez      : 1975      MRN: [id number]  Encounter Provider: POLA Luevano  Encounter Date: 3/25/2025   Encounter department: [facility]  :  Assessment & Plan  Essential hypertension  Stable. Taking medication as prescribed.  Monitor. Stress management.   Routine labs ordered.   Orders:    CBC and differential; Future    Comprehensive metabolic panel; Future    Mixed hyperlipidemia  Stable. Taking medication as prescribed.  Monitor. Regular exercise.   Routine labs ordered.   Orders:    CBC and differential; Future    Comprehensive metabolic panel; Future    Lipid panel; Future    Prediabetes  Stable. Monitor.   Educated on heart healthy diet, carb control, limit salt, limit red meats and junk food.  Routine labs ordered.   Orders:    CBC and differential; Future    Comprehensive metabolic panel; Future    Hemoglobin A1C; Future           History of Present Illness   Here for med check.   Taking medications as prescribed.   Pt reports feeling well and no acute complaints.   Denies any side affects.   No recent illness.       Review of Systems   Constitutional:  Negative for fatigue, fever and unexpected weight change.   HENT:  Negative for congestion, ear pain, postnasal drip, rhinorrhea, sinus pain and sore throat.    Eyes:  Negative for photophobia and visual disturbance.   Respiratory:  Negative for cough, chest tightness and shortness of breath.    Cardiovascular:  Negative for chest pain, palpitations and leg swelling.   Gastrointestinal:  Negative for abdominal distention, abdominal pain, constipation, diarrhea and vomiting.   Endocrine: Negative for polyphagia and polyuria.   Genitourinary:  Negative for difficulty urinating, dysuria, flank pain, frequency, hematuria and urgency.   Musculoskeletal:  Negative for arthralgias, back pain, myalgias and neck pain.   Skin:  Negative for rash and wound.   Allergic/Immunologic: Negative for immunocompromised  "state.   Neurological:  Negative for dizziness, seizures, weakness, light-headedness and headaches.   Psychiatric/Behavioral:  Negative for agitation, dysphoric mood, self-injury and sleep disturbance. The patient is not nervous/anxious.        Objective   /80   Pulse 76   Temp (!) 97.2 °F (36.2 °C)   Resp 18   Ht 5' 7\" (1.702 m)   Wt 75.4 kg (166 lb 3.2 oz)   SpO2 97%   BMI 26.03 kg/m²      Physical Exam  Constitutional:       General: He is not in acute distress.     Appearance: Normal appearance. He is well-developed. He is not ill-appearing.   HENT:      Head: Normocephalic and atraumatic.      Right Ear: Tympanic membrane and ear canal normal.      Left Ear: Tympanic membrane and ear canal normal.      Nose: Nose normal.      Mouth/Throat:      Mouth: Mucous membranes are moist.      Pharynx: Oropharynx is clear.   Eyes:      General: No scleral icterus.     Extraocular Movements: Extraocular movements intact.      Pupils: Pupils are equal, round, and reactive to light.   Neck:      Thyroid: No thyromegaly.      Vascular: No carotid bruit.   Cardiovascular:      Rate and Rhythm: Normal rate and regular rhythm.      Heart sounds: No murmur heard.  Pulmonary:      Effort: Pulmonary effort is normal. No respiratory distress.      Breath sounds: Normal breath sounds. No wheezing or rales.   Abdominal:      General: Bowel sounds are normal. There is no distension.      Palpations: Abdomen is soft.      Tenderness: There is no abdominal tenderness.   Musculoskeletal:         General: Normal range of motion.      Cervical back: Normal range of motion and neck supple.      Right lower leg: No edema.      Left lower leg: No edema.   Lymphadenopathy:      Cervical: No cervical adenopathy.   Skin:     General: Skin is warm and dry.      Coloration: Skin is not jaundiced or pale.   Neurological:      General: No focal deficit present.      Mental Status: He is alert and oriented to person, place, and time.     "  Cranial Nerves: No cranial nerve deficit.      Sensory: No sensory deficit.      Coordination: Coordination normal.      Gait: Gait normal.   Psychiatric:         Mood and Affect: Mood normal.         Behavior: Behavior normal.         Thought Content: Thought content normal.         Judgment: Judgment normal.

## 2025-03-25 NOTE — ASSESSMENT & PLAN NOTE
Stable. Taking medication as prescribed.  Monitor. Regular exercise.   Routine labs ordered.   Orders:    CBC and differential; Future    Comprehensive metabolic panel; Future    Lipid panel; Future